# Patient Record
Sex: FEMALE | Race: OTHER | NOT HISPANIC OR LATINO | URBAN - METROPOLITAN AREA
[De-identification: names, ages, dates, MRNs, and addresses within clinical notes are randomized per-mention and may not be internally consistent; named-entity substitution may affect disease eponyms.]

---

## 2020-11-12 ENCOUNTER — INPATIENT (INPATIENT)
Facility: HOSPITAL | Age: 75
LOS: 1 days | Discharge: HOME HEALTH | DRG: 871 | End: 2020-11-14
Attending: HOSPITALIST | Admitting: HOSPITALIST
Payer: MEDICARE

## 2020-11-12 VITALS
HEART RATE: 96 BPM | WEIGHT: 210.1 LBS | OXYGEN SATURATION: 93 % | SYSTOLIC BLOOD PRESSURE: 101 MMHG | DIASTOLIC BLOOD PRESSURE: 71 MMHG | TEMPERATURE: 99 F | HEIGHT: 70 IN | RESPIRATION RATE: 16 BRPM

## 2020-11-12 DIAGNOSIS — J18.9 PNEUMONIA, UNSPECIFIED ORGANISM: ICD-10-CM

## 2020-11-12 DIAGNOSIS — A41.9 SEPSIS, UNSPECIFIED ORGANISM: ICD-10-CM

## 2020-11-12 DIAGNOSIS — Z29.9 ENCOUNTER FOR PROPHYLACTIC MEASURES, UNSPECIFIED: ICD-10-CM

## 2020-11-12 DIAGNOSIS — E04.1 NONTOXIC SINGLE THYROID NODULE: ICD-10-CM

## 2020-11-12 DIAGNOSIS — N39.0 URINARY TRACT INFECTION, SITE NOT SPECIFIED: ICD-10-CM

## 2020-11-12 DIAGNOSIS — R41.82 ALTERED MENTAL STATUS, UNSPECIFIED: ICD-10-CM

## 2020-11-12 DIAGNOSIS — J44.9 CHRONIC OBSTRUCTIVE PULMONARY DISEASE, UNSPECIFIED: ICD-10-CM

## 2020-11-12 LAB
ALBUMIN SERPL ELPH-MCNC: 3.4 G/DL — SIGNIFICANT CHANGE UP (ref 3.4–5)
ALP SERPL-CCNC: 79 U/L — SIGNIFICANT CHANGE UP (ref 40–120)
ALT FLD-CCNC: 23 U/L — SIGNIFICANT CHANGE UP (ref 12–42)
AMPHET UR-MCNC: NEGATIVE — SIGNIFICANT CHANGE UP
ANION GAP SERPL CALC-SCNC: 9 MMOL/L — SIGNIFICANT CHANGE UP (ref 9–16)
APPEARANCE UR: CLEAR — SIGNIFICANT CHANGE UP
APTT BLD: 32.6 SEC — SIGNIFICANT CHANGE UP (ref 27.5–35.5)
AST SERPL-CCNC: 28 U/L — SIGNIFICANT CHANGE UP (ref 15–37)
BACTERIA # UR AUTO: ABNORMAL /HPF
BARBITURATES UR SCN-MCNC: NEGATIVE — SIGNIFICANT CHANGE UP
BASOPHILS # BLD AUTO: 0.04 K/UL — SIGNIFICANT CHANGE UP (ref 0–0.2)
BASOPHILS NFR BLD AUTO: 0.3 % — SIGNIFICANT CHANGE UP (ref 0–2)
BENZODIAZ UR-MCNC: NEGATIVE — SIGNIFICANT CHANGE UP
BILIRUB SERPL-MCNC: 0.8 MG/DL — SIGNIFICANT CHANGE UP (ref 0.2–1.2)
BILIRUB UR-MCNC: NEGATIVE — SIGNIFICANT CHANGE UP
BUN SERPL-MCNC: 22 MG/DL — SIGNIFICANT CHANGE UP (ref 7–23)
CALCIUM SERPL-MCNC: 9.6 MG/DL — SIGNIFICANT CHANGE UP (ref 8.5–10.5)
CHLORIDE SERPL-SCNC: 104 MMOL/L — SIGNIFICANT CHANGE UP (ref 96–108)
CK SERPL-CCNC: 89 U/L — SIGNIFICANT CHANGE UP (ref 26–192)
CO2 SERPL-SCNC: 28 MMOL/L — SIGNIFICANT CHANGE UP (ref 22–31)
COCAINE METAB.OTHER UR-MCNC: NEGATIVE — SIGNIFICANT CHANGE UP
COLOR SPEC: YELLOW — SIGNIFICANT CHANGE UP
CREAT SERPL-MCNC: 1.05 MG/DL — SIGNIFICANT CHANGE UP (ref 0.5–1.3)
DIFF PNL FLD: ABNORMAL
EOSINOPHIL # BLD AUTO: 0.03 K/UL — SIGNIFICANT CHANGE UP (ref 0–0.5)
EOSINOPHIL NFR BLD AUTO: 0.2 % — SIGNIFICANT CHANGE UP (ref 0–6)
EPI CELLS # UR: SIGNIFICANT CHANGE UP /HPF (ref 0–5)
ETHANOL SERPL-MCNC: <3 MG/DL — SIGNIFICANT CHANGE UP
GLUCOSE SERPL-MCNC: 135 MG/DL — HIGH (ref 70–99)
GLUCOSE UR QL: NEGATIVE — SIGNIFICANT CHANGE UP
HCT VFR BLD CALC: 42.6 % — SIGNIFICANT CHANGE UP (ref 34.5–45)
HGB BLD-MCNC: 13.8 G/DL — SIGNIFICANT CHANGE UP (ref 11.5–15.5)
IMM GRANULOCYTES NFR BLD AUTO: 0.4 % — SIGNIFICANT CHANGE UP (ref 0–1.5)
INR BLD: 1.11 — SIGNIFICANT CHANGE UP (ref 0.88–1.16)
KETONES UR-MCNC: NEGATIVE — SIGNIFICANT CHANGE UP
LACTATE SERPL-SCNC: 1.2 MMOL/L — SIGNIFICANT CHANGE UP (ref 0.4–2)
LEUKOCYTE ESTERASE UR-ACNC: ABNORMAL
LIDOCAIN IGE QN: 84 U/L — SIGNIFICANT CHANGE UP (ref 73–393)
LYMPHOCYTES # BLD AUTO: 1.45 K/UL — SIGNIFICANT CHANGE UP (ref 1–3.3)
LYMPHOCYTES # BLD AUTO: 11.6 % — LOW (ref 13–44)
MAGNESIUM SERPL-MCNC: 2.1 MG/DL — SIGNIFICANT CHANGE UP (ref 1.6–2.6)
MCHC RBC-ENTMCNC: 30.8 PG — SIGNIFICANT CHANGE UP (ref 27–34)
MCHC RBC-ENTMCNC: 32.4 GM/DL — SIGNIFICANT CHANGE UP (ref 32–36)
MCV RBC AUTO: 95.1 FL — SIGNIFICANT CHANGE UP (ref 80–100)
METHADONE UR-MCNC: NEGATIVE — SIGNIFICANT CHANGE UP
MONOCYTES # BLD AUTO: 0.7 K/UL — SIGNIFICANT CHANGE UP (ref 0–0.9)
MONOCYTES NFR BLD AUTO: 5.6 % — SIGNIFICANT CHANGE UP (ref 2–14)
NEUTROPHILS # BLD AUTO: 10.24 K/UL — HIGH (ref 1.8–7.4)
NEUTROPHILS NFR BLD AUTO: 81.9 % — HIGH (ref 43–77)
NITRITE UR-MCNC: POSITIVE
NRBC # BLD: 0 /100 WBCS — SIGNIFICANT CHANGE UP (ref 0–0)
NT-PROBNP SERPL-SCNC: 232 PG/ML — SIGNIFICANT CHANGE UP
OPIATES UR-MCNC: NEGATIVE — SIGNIFICANT CHANGE UP
PCP SPEC-MCNC: SIGNIFICANT CHANGE UP
PCP UR-MCNC: NEGATIVE — SIGNIFICANT CHANGE UP
PH UR: 6 — SIGNIFICANT CHANGE UP (ref 5–8)
PLATELET # BLD AUTO: 240 K/UL — SIGNIFICANT CHANGE UP (ref 150–400)
POTASSIUM SERPL-MCNC: 4.7 MMOL/L — SIGNIFICANT CHANGE UP (ref 3.5–5.3)
POTASSIUM SERPL-SCNC: 4.7 MMOL/L — SIGNIFICANT CHANGE UP (ref 3.5–5.3)
PROT SERPL-MCNC: 7.9 G/DL — SIGNIFICANT CHANGE UP (ref 6.4–8.2)
PROT UR-MCNC: 30 MG/DL
PROTHROM AB SERPL-ACNC: 13.2 SEC — SIGNIFICANT CHANGE UP (ref 10.6–13.6)
RAPID RVP RESULT: SIGNIFICANT CHANGE UP
RBC # BLD: 4.48 M/UL — SIGNIFICANT CHANGE UP (ref 3.8–5.2)
RBC # FLD: 13.9 % — SIGNIFICANT CHANGE UP (ref 10.3–14.5)
RBC CASTS # UR COMP ASSIST: ABNORMAL /HPF
SARS-COV-2 RNA SPEC QL NAA+PROBE: SIGNIFICANT CHANGE UP
SARS-COV-2 RNA SPEC QL NAA+PROBE: SIGNIFICANT CHANGE UP
SODIUM SERPL-SCNC: 141 MMOL/L — SIGNIFICANT CHANGE UP (ref 132–145)
SP GR SPEC: 1.02 — SIGNIFICANT CHANGE UP (ref 1–1.03)
THC UR QL: NEGATIVE — SIGNIFICANT CHANGE UP
TROPONIN I SERPL-MCNC: <0.017 NG/ML — LOW (ref 0.02–0.06)
UROBILINOGEN FLD QL: 0.2 E.U./DL — SIGNIFICANT CHANGE UP
WBC # BLD: 12.51 K/UL — HIGH (ref 3.8–10.5)
WBC # FLD AUTO: 12.51 K/UL — HIGH (ref 3.8–10.5)
WBC UR QL: > 10 /HPF

## 2020-11-12 PROCEDURE — 72170 X-RAY EXAM OF PELVIS: CPT | Mod: 26

## 2020-11-12 PROCEDURE — 71045 X-RAY EXAM CHEST 1 VIEW: CPT | Mod: 26

## 2020-11-12 PROCEDURE — 99223 1ST HOSP IP/OBS HIGH 75: CPT | Mod: GC

## 2020-11-12 PROCEDURE — 72125 CT NECK SPINE W/O DYE: CPT | Mod: 26

## 2020-11-12 PROCEDURE — 93010 ELECTROCARDIOGRAM REPORT: CPT

## 2020-11-12 PROCEDURE — 70450 CT HEAD/BRAIN W/O DYE: CPT | Mod: 26

## 2020-11-12 PROCEDURE — 99285 EMERGENCY DEPT VISIT HI MDM: CPT

## 2020-11-12 RX ORDER — QUETIAPINE FUMARATE 200 MG/1
25 TABLET, FILM COATED ORAL ONCE
Refills: 0 | Status: COMPLETED | OUTPATIENT
Start: 2020-11-12 | End: 2020-11-12

## 2020-11-12 RX ORDER — ACETAMINOPHEN 500 MG
1000 TABLET ORAL ONCE
Refills: 0 | Status: COMPLETED | OUTPATIENT
Start: 2020-11-12 | End: 2020-11-12

## 2020-11-12 RX ORDER — ENOXAPARIN SODIUM 100 MG/ML
40 INJECTION SUBCUTANEOUS EVERY 24 HOURS
Refills: 0 | Status: DISCONTINUED | OUTPATIENT
Start: 2020-11-12 | End: 2020-11-14

## 2020-11-12 RX ORDER — SODIUM CHLORIDE 9 MG/ML
1000 INJECTION INTRAMUSCULAR; INTRAVENOUS; SUBCUTANEOUS
Refills: 0 | Status: DISCONTINUED | OUTPATIENT
Start: 2020-11-12 | End: 2020-11-14

## 2020-11-12 RX ORDER — SODIUM CHLORIDE 9 MG/ML
1000 INJECTION INTRAMUSCULAR; INTRAVENOUS; SUBCUTANEOUS
Refills: 0 | Status: DISCONTINUED | OUTPATIENT
Start: 2020-11-12 | End: 2020-11-12

## 2020-11-12 RX ORDER — AZITHROMYCIN 500 MG/1
500 TABLET, FILM COATED ORAL EVERY 24 HOURS
Refills: 0 | Status: COMPLETED | OUTPATIENT
Start: 2020-11-12 | End: 2020-11-14

## 2020-11-12 RX ORDER — CEFTRIAXONE 500 MG/1
1000 INJECTION, POWDER, FOR SOLUTION INTRAMUSCULAR; INTRAVENOUS EVERY 24 HOURS
Refills: 0 | Status: DISCONTINUED | OUTPATIENT
Start: 2020-11-13 | End: 2020-11-14

## 2020-11-12 RX ORDER — CEFTRIAXONE 500 MG/1
1000 INJECTION, POWDER, FOR SOLUTION INTRAMUSCULAR; INTRAVENOUS ONCE
Refills: 0 | Status: COMPLETED | OUTPATIENT
Start: 2020-11-12 | End: 2020-11-12

## 2020-11-12 RX ORDER — SODIUM CHLORIDE 9 MG/ML
1000 INJECTION INTRAMUSCULAR; INTRAVENOUS; SUBCUTANEOUS ONCE
Refills: 0 | Status: COMPLETED | OUTPATIENT
Start: 2020-11-12 | End: 2020-11-12

## 2020-11-12 RX ADMIN — AZITHROMYCIN 255 MILLIGRAM(S): 500 TABLET, FILM COATED ORAL at 22:23

## 2020-11-12 RX ADMIN — Medication 400 MILLIGRAM(S): at 17:54

## 2020-11-12 RX ADMIN — CEFTRIAXONE 100 MILLIGRAM(S): 500 INJECTION, POWDER, FOR SOLUTION INTRAMUSCULAR; INTRAVENOUS at 12:18

## 2020-11-12 RX ADMIN — SODIUM CHLORIDE 130 MILLILITER(S): 9 INJECTION INTRAMUSCULAR; INTRAVENOUS; SUBCUTANEOUS at 19:32

## 2020-11-12 RX ADMIN — SODIUM CHLORIDE 100 MILLILITER(S): 9 INJECTION INTRAMUSCULAR; INTRAVENOUS; SUBCUTANEOUS at 22:25

## 2020-11-12 RX ADMIN — SODIUM CHLORIDE 1000 MILLILITER(S): 9 INJECTION INTRAMUSCULAR; INTRAVENOUS; SUBCUTANEOUS at 12:19

## 2020-11-12 RX ADMIN — ENOXAPARIN SODIUM 40 MILLIGRAM(S): 100 INJECTION SUBCUTANEOUS at 19:32

## 2020-11-12 RX ADMIN — QUETIAPINE FUMARATE 25 MILLIGRAM(S): 200 TABLET, FILM COATED ORAL at 13:24

## 2020-11-12 NOTE — ED ADULT NURSE REASSESSMENT NOTE - NS ED NURSE REASSESS COMMENT FT1
Pt asking to C-Collar to be removed, explained to patient that she could suffer permanent disability if C-collar is removed prior to imaging. Upon reassessment of patient, patient self-discontinued her C-Collar. Patient refusing to have the c-collar placed back on her neck, explained to patient the risks of leaving c-collar off prior to imaging - despite reinforcement patient adamantly refuses.

## 2020-11-12 NOTE — H&P ADULT - PROBLEM SELECTOR PLAN 1
Patient meets 3/4 SIRS criteria (fever, tachycardia, leukocytosis) with likely source being UTI. S/p 1g Rocephin and 1L NS bolus in ED. Currently patient is somnolent likely due to being given Seroquel, however cannot rule out AMS due to sepsis from UTI.   -Continue ceftriaxone 1g once daily for 7 days   -MIVF (130cc/hr) for 12 hours   -Etiologies include sepsis 2/2 UTI vs pyelonephritis   -F/u blood and urine cultures  -Reassess neurologic status once Seroquel has weaned off. Dysphagia screen before starting diet. Patient meets 3/4 SIRS criteria (fever, tachycardia, leukocytosis) with likely source being UTI. S/p 1g Rocephin and 1L NS bolus in ED. Currently patient is somnolent likely due to being given Seroquel, however cannot rule out AMS due to sepsis from UTI.   -Etiologies include sepsis 2/2 UTI vs pyelonephritis   -Continue ceftriaxone 1g once daily for 7 days   -MIVF (130cc/hr) for 12 hours   -F/u blood and urine cultures Patient meets 3/4 SIRS criteria (fever, tachycardia, leukocytosis) with likely source being UTI vs pneumonia. S/p 1g Rocephin and 1L NS bolus in ED. Currently patient is somnolent likely due to being given Seroquel, however cannot rule out AMS due to sepsis from UTI.   -Continue ceftriaxone 1g once daily for 7 days - for UTI and CAP  -Azithromycin 500mg q24H for 3 days for coverage of CAP   -MIVF (100cc/hr) for 12 hours   -F/u blood and urine cultures

## 2020-11-12 NOTE — ED PROVIDER NOTE - MUSCULOSKELETAL, MLM
Spine appears normal, no c-spine tenderness, no obvious signs of trauma, range of motion is not limited, no muscle or joint tenderness

## 2020-11-12 NOTE — H&P ADULT - NSHPSOCIALHISTORY_GEN_ALL_CORE
Unable to obtain due to clinical status Unable to obtain due to clinical status    ADDENDUM: former smoker

## 2020-11-12 NOTE — H&P ADULT - ATTENDING COMMENTS
patient seen and examined overnight ; more alert and less somnolent at time of my exam, able to give more past medical history  ; pt requesting her COPD meds    reviewed pertinent data , h&p    obese, female, NAD, mmm, s1s2, fine bibasilar crackles ; abdomen soft/nt/nd; +LLEXT calf edema and tenderness     1. sepsis/ UTI/ PNA: ON cef/ azithromycin, followup ctxs, on IVFs o/n.   2. followup lower ext dopplers r/o  dvt   3. obtain med rec       rest of  plan as above, with noted addenda

## 2020-11-12 NOTE — H&P ADULT - PROBLEM SELECTOR PLAN 4
1.3 cm hypodense nodule within the left thyroid lobe - can be evaluated with a dedicated outpatient thyroid ultrasound  -F/u thyroid studies Per records, patient has diagnosis of COPD, however unable to assess functional capacity or oxygen requirements.   -Currently patient is on 2L NC. In ED, patient was 96% on RA. Wean as tolerated.   -Will need further information from patient once more awake Patient noted to be somnolent but arousable to verbal and tactile stimulation, able to squeeze fingers on command. However in ED prior to being transferred to [facility], patient noted to be much more awake and conversational, at one point verbalizing that she does not want cervical collar - therefore AMS likely due to Seroquel given in ED, however cannot rule out encephalopathy 2/2 sepsis from UTI.   -UTox negative   -Reassess neurologic status once Seroquel has weaned off. Dysphagia screen before starting diet.

## 2020-11-12 NOTE — H&P ADULT - ASSESSMENT
75 yo F w/ PMHx of COPD and L eye blindness who was BIBEMS after being found down, admitted for sepsis 2/2 UTI.

## 2020-11-12 NOTE — ED PROVIDER NOTE - ATTENDING CONTRIBUTION TO CARE
Pt is a 75yo F with a h/o COPD who presents s/p fall.  Was down on floor for ~ 6 hours and couldn't get up.    PE - agree with PA exam as above.   A/P - Fall, generalized weakness, AMS.   Concern for pt's well being as she appears altered and unstable for d/c.

## 2020-11-12 NOTE — ED PROVIDER NOTE - OBJECTIVE STATEMENT
75 yo F w/ PMHx of COPD, uses walker, has HHA, is a poor historian BIBEMS after pt was found down on the floor for 6 hours. Pt used Life Alert necklace; pt cannot recall events preceding the fall, she doesn't know she got onto the ground. Pt c/o generalized myalgias. Pt denies fever, chills, N/V.

## 2020-11-12 NOTE — ED ADULT NURSE NOTE - NSIMPLEMENTINTERV_GEN_ALL_ED
Implemented All Universal Safety Interventions:  [place] to call system. Call bell, personal items and telephone within reach. Instruct patient to call for assistance. Room bathroom lighting operational. Non-slip footwear when patient is off stretcher. Physically safe environment: no spills, clutter or unnecessary equipment. Stretcher in lowest position, wheels locked, appropriate side rails in place.

## 2020-11-12 NOTE — H&P ADULT - HISTORY OF PRESENT ILLNESS
73 yo F w/ PMHx of COPD, uses walker, has HHA, is a poor historian BIBEMS after pt was found down on the floor for 6 hours. Pt used Life Alert necklace; pt cannot recall events preceding the fall, she doesn't know she got onto the ground. Pt c/o generalized myalgias. Pt denies fever, chills, N/V. 75 yo F w/ PMHx of COPD and L eye blindness who was BIBEMS after pt was found down on the floor for 6 hours. Patient could not answer questions during exam due to being somnolent after being given Seroquel in the ED, but information from previous note shows that patient used Life Alert necklace - cannot recall events preceding the fall and does not know how she got onto the ground. Patient admits to generalized myalgias but denies any fever or chills. Further information was unable to be obtained from patient, and she has no contact information other than a passport from [facility] in her belongings.     Of note, papers found in patient's belongings show that she was recently seen in [facility] ED on 11/2/20 for neck pain s/p fall from standing. Obtained collateral from [facility]: vitals stable in ED, imaging (CXR, CT C spine) negative for fracture or intracranial abnormality and UA clean at that time. Discharged from ED with no complications. Home meds not listed in records at [facility].     ED Course:   Vitals: /77, P 106, O2 96% 2LNC, R 18, T 101.8 (rectal)  Labs: WBC 12.5 w/ neutro predominance, trops negative, lactate 1.2, UA c/w UTI.   EKG: NSR  Meds: 1g Ceftriaxone, 25mg Seroquel,   Imaging: 73 yo F w/ PMHx of COPD and L eye blindness who was BIBEMS after pt was found down on the floor for 6 hours. Upon arrival to ED, patient was conversational and able to answer questions. Per ED note, patient used Life Alert necklace - cannot recall events preceding the fall and does not know how she got onto the ground. Patient admits to generalized myalgias but denies any fever or chills. In ED, patient noted to be clinically deteriorating and becoming altered - when assessed on the floor, was very somnolent and could not answer questions likely due to being given Seroquel in the ED. Further information was unable to be obtained from patient, and she has no contact information other than a passport from [facility] in her belongings.     Of note, papers found in patient's belongings show that she was recently seen in [place] ED on 11/2/20 for neck pain s/p fall from standing. Obtained collateral from [facility]: vitals stable in ED, imaging (CXR, CT C spine) negative for fracture or intracranial abnormality and UA clean at that time. Discharged from ED with no complications. Home meds not listed in records at [facility].     ED Course:   Vitals: /77, P 106, O2 96% 2LNC, R 18, T 101.8 (rectal)  Labs: WBC 12.5 w/ neutro predominance, trops negative, lactate 1.2, UA c/w UTI.   EKG: NSR  Meds: 1g Ceftriaxone, 25mg Seroquel, 1L NS bolus given, IV tylenol.   Imaging: CTH, CXR unremarkable.   Micro: Blood cultures x 2 sent. Urine culture sent 75 yo F w/ PMHx of COPD and L eye blindness who was BIBEMS after pt was found down on the floor for 6 hours. Upon arrival to ED, patient was conversational and able to answer questions. Per ED note, patient used Life Alert necklace - cannot recall events preceding the fall and does not know how she got onto the ground. Patient admits to generalized myalgias but denies any fever or chills. In ED, patient noted to be clinically deteriorating and becoming altered - when assessed on the floor, was very somnolent and could not answer questions likely due to being given Seroquel in the ED. Further information was unable to be obtained from patient, and she has no contact information other than a passport from [facility] in her belongings.     Of note, papers found in patient's belongings show that she was recently seen in [place] ED on 11/2/20 for neck pain s/p fall from standing. Obtained collateral from [place]: vitals stable in ED, imaging (CXR, CT C spine) negative for fracture or intracranial abnormality and UA clean at that time. Discharged from ED with no complications. Home meds not listed in records at [place].     ED Course:   Vitals: /77, P 106, O2 96% 2LNC, R 18, T 101.8 (rectal)  Labs: WBC 12.5 w/ neutro predominance, trops negative, lactate 1.2, UA c/w UTI. UTox and alcohol negative.   Micro: Blood cultures x 2 sent. Urine culture sent   EKG: NSR  Meds: 1g Ceftriaxone, 25mg Seroquel, 1L NS bolus given, IV tylenol.   Imaging: CTH, CXR unremarkable. C Spine showed small thyroid nodule. 73 yo F w/ PMHx of COPD and L eye blindness who was BIBEMS after pt was found down on the floor for 6 hours - transferred from [facility]. Upon arrival to ED, patient was conversational and able to answer questions. Per ED note, patient used Life Alert necklace - cannot recall events preceding the fall and does not know how she got onto the ground. Patient admits to generalized myalgias but denies any fever or chills. In ED, patient noted to be clinically deteriorating and becoming altered - when assessed on the floor, was very somnolent and could not answer questions likely due to being given Seroquel in the ED. Further information was unable to be obtained from patient, and she has no contact information other than a passport from [facility] in her belongings.     Of note, papers found in patient's belongings show that she was recently seen in [place] ED on 11/2/20 for neck pain s/p fall from standing. Obtained collateral from [place]: vitals stable in ED, imaging (CXR, CT C spine) negative for fracture or intracranial abnormality and UA clean at that time. Discharged from ED with no complications. Home meds not listed in records at [place].     ED Course:   Vitals: /77, P 106, O2 96% 2LNC, R 18, T 101.8 (rectal)  Labs: WBC 12.5 w/ neutro predominance, trops negative, lactate 1.2, UA c/w UTI. UTox and alcohol negative.   Micro: Blood cultures x 2 sent. Urine culture sent   EKG: NSR  Meds: 1g Ceftriaxone, 25mg Seroquel, 1L NS bolus given, IV tylenol.   Imaging: CTH, CXR with fluffy infiltrates b/L - cannot rule out multifocal PNA vs COVID. C Spine showed small thyroid nodule.

## 2020-11-12 NOTE — H&P ADULT - NSHPLABSRESULTS_GEN_ALL_CORE
LABS:                        13.8   12.51 )-----------( 240      ( 2020 09:18 )             42.6     12    141  |  104  |  22  ----------------------------<  135<H>  4.7   |  28  |  1.05    Ca    9.6      2020 09:18  Mg     2.1         TPro  7.9  /  Alb  3.4  /  TBili  0.8  /  DBili  x   /  AST  28  /  ALT  23  /  AlkPhos  79  12    PT/INR - ( 2020 09:18 )   PT: 13.2 sec;   INR: 1.11          PTT - ( 2020 09:18 )  PTT:32.6 sec  Urinalysis Basic - ( 2020 11:45 )    Color: Yellow / Appearance: Clear / S.020 / pH: x  Gluc: x / Ketone: NEGATIVE  / Bili: NEGATIVE / Urobili: 0.2 E.U./dL   Blood: x / Protein: 30 mg/dL / Nitrite: POSITIVE   Leuk Esterase: Moderate / RBC: 5-10 /HPF / WBC > 10 /HPF   Sq Epi: x / Non Sq Epi: 0-5 /HPF / Bacteria: Many /HPF      CAPILLARY BLOOD GLUCOSE      POCT Blood Glucose.: 129 mg/dL (2020 09:06)        RADIOLOGY & ADDITIONAL TESTS: Reviewed.    CTH: IMPRESSION: No intracranial hemorrhage or calvarial fracture.  CT C Spine: No acute fracture or malalignment. Multilevel degenerative changes of the cervical spine as above.    1.3 cm hypodense nodule within the left thyroid lobe, this can be evaluated with a dedicated outpatient thyroid ultrasound.

## 2020-11-12 NOTE — ED ADULT NURSE NOTE - CHPI ED NUR SYMPTOMS NEG
no deformity/no weakness/no tingling/no abrasion/no confusion/no vomiting/no bleeding/no loss of consciousness/no numbness/no fever

## 2020-11-12 NOTE — H&P ADULT - PROBLEM SELECTOR PLAN 7
F: s/p 1L NS bolus. MIVF running.  E: replete PRN   N: start once passes dysphagia screen  GI: none needed at this time   DVT: Lovenox    Dispo: RM[facility]  Code Status: Full ADDENDUM: reports left calf tenderness on palpation, there lower ext asymmetry (left larger than right); ordered for dopplers r/o DVT

## 2020-11-12 NOTE — H&P ADULT - PROBLEM SELECTOR PROBLEM 3
COPD (chronic obstructive pulmonary disease) Altered mental status Pneumonia of both lower lobes due to infectious organism

## 2020-11-12 NOTE — H&P ADULT - PROBLEM SELECTOR PLAN 5
F: s/p 1L NS bolus   E: replete PRN   N: start once passes dysphagia screen 1.3 cm hypodense nodule within the left thyroid lobe - can be evaluated with a dedicated outpatient thyroid ultrasound  -F/u thyroid studies Per records, patient has diagnosis of COPD, however unable to assess functional capacity or oxygen requirements.   -Currently patient is on 2L NC. In ED, patient was 96% on RA. Wean as tolerated.   -Will need further information from patient once more awake

## 2020-11-12 NOTE — ED PROVIDER NOTE - GASTROINTESTINAL NEGATIVE STATEMENT, MLM
no nausea and no vomiting. no abdominal pain, no bloating, no constipation, no diarrhea, no nausea and no vomiting.

## 2020-11-12 NOTE — ED ADULT TRIAGE NOTE - CHIEF COMPLAINT QUOTE
BIBA after fall this morning, pressed her life alert necklace. pt is poor historian, unable to fully recall events. pt had discharge paperwork from [place] from 11/02/2020 visit s/p fall with neck pain. +pt arrives with c-collar.

## 2020-11-12 NOTE — H&P ADULT - PROBLEM SELECTOR PLAN 3
Per records, patient has diagnosis of COPD, however unable to assess functional capacity or oxygen requirements.   -Currently patient is on 2L NC  -Will need further information from patient once more awake Patient noted to be somnolent but arousable to verbal and tactile stimulation, able to squeeze fingers on command. However in ED prior to being transferred to [facility], patient noted to be much more awake and conversational, at one point verbalizing that she does not want cervical collar - therefore AMS likely due to Seroquel given in ED, however cannot rule out encephalopathy 2/2 sepsis from UTI.   -Reassess neurologic status once Seroquel has weaned off. Dysphagia screen before starting diet. Patient noted to be somnolent but arousable to verbal and tactile stimulation, able to squeeze fingers on command. However in ED prior to being transferred to [facility], patient noted to be much more awake and conversational, at one point verbalizing that she does not want cervical collar - therefore AMS likely due to Seroquel given in ED, however cannot rule out encephalopathy 2/2 sepsis from UTI.   -UTox negative   -Reassess neurologic status once Seroquel has weaned off. Dysphagia screen before starting diet. ADDENDUM: noted CXR findings, atypical PNA vs COVID, first swab negative will repeat COVID swab, check RVP and also urine legionella; will cover for CAP w/ cef / azithro ADDENDUM: noted CXR findings, atypical PNA vs COVID, first swab negative will repeat COVID swab, check RVP and also urine legionella; will cover for CAP w/ cef / azithro  -Lung exam unremarkable   -F/u labs as mentioned above, in addition to: CRP, procal, ferritin, d-dimer  [facility], however repeated to ensure no cardiac causes. Repeat trops ordered for 10 PM and 11/13 AM

## 2020-11-12 NOTE — H&P ADULT - PROBLEM SELECTOR PLAN 6
F: s/p 1L NS bolus. MIVF running.  E: replete PRN   N: start once passes dysphagia screen  GI: none needed at this time   DVT: Lovenox    Dispo: [facility]  Code Status: Full 1.3 cm hypodense nodule within the left thyroid lobe - can be evaluated with a dedicated outpatient thyroid ultrasound  -F/u thyroid studies

## 2020-11-12 NOTE — ED ADULT NURSE NOTE - CHIEF COMPLAINT QUOTE
BIBA after fall this morning, pressed her life alert necklace. pt is poor historian, unable to fully recall events. pt had discharge paperwork from [place] from 11/02/2020 visit s/p fall with neck pain. +pt arrives with c-collar.

## 2020-11-12 NOTE — H&P ADULT - NSHPPHYSICALEXAM_GEN_ALL_CORE
General: Patient somnolent but arousable to verbal and tactile stimulation.  HEENT: pupils equal round and reactive to light bilaterally, opens eyes to verbal and tactile stimulation. Oral mucosa appears well hydrated, no lesions noted. No thyromegaly, no JVD.   Cardiac: tachycardic, regular rhythm. No m/r/g.   Pulm: CTA bilaterally   Abdominal: soft, NT/ND. No suprapubic tenderness. Unable to assess CVAT. No organomegaly. no rebound tenderness or guarding. Bowel sounds normoactive.   Extremities: bilateral feet cold to touch however DP/PT and radial pulses palpable 2+. No edema, clubbing or cyanosis.   MSK: unable to assess.   Neuro:  strength intact b/L. Could not assess muscle strength or sensation. Withdraws from pain.   Psych: somnolent, unable to assess mood and affect. General: Patient somnolent but arousable to verbal and tactile stimulation.  HEENT: pupils equal round and reactive to light bilaterally, opens eyes to verbal and tactile stimulation. Oral mucosa appears well hydrated, no lesions noted. No thyromegaly, no JVD.   Cardiac: tachycardic, regular rhythm. No m/r/g.   Pulm: CTA bilaterally   Abdominal: soft, NT/ND. No suprapubic tenderness. Unable to assess CVAT. No organomegaly. no rebound tenderness or guarding. Bowel sounds normoactive.   Extremities: bilateral feet cold to touch however DP/PT and radial pulses palpable 2+. No edema, clubbing or cyanosis.   Skin: no ulcers or rashes noted. Skin warm to touch except for cold feet bilaterally.   MSK: unable to assess.   Neuro:  strength intact b/L. Could not assess muscle strength or sensation. Withdraws from pain.   Psych: somnolent, unable to assess mood and affect. General: Patient somnolent but arousable to verbal and tactile stimulation.  HEENT: pupils equal round and reactive to light bilaterally, opens eyes to verbal and tactile stimulation. Oral mucosa appears well hydrated, no lesions noted. No thyromegaly, no JVD.   Cardiac: tachycardic, regular rhythm. No m/r/g.   Pulm: CTA bilaterally   Abdominal: soft, NT/ND. No suprapubic tenderness. Unable to assess CVAT. No organomegaly. no rebound tenderness or guarding. Bowel sounds normoactive.   Extremities: bilateral feet cold to touch however DP/PT and radial pulses palpable 2+. No edema, clubbing or cyanosis.   Skin: no ulcers or rashes noted. Skin warm to touch except for cold feet bilaterally.   MSK: unable to assess.   Neuro:  strength intact b/L. Could not assess muscle strength or sensation. Withdraws from pain. Intermittently follows commands.  Psych: somnolent, unable to assess mood and affect.

## 2020-11-13 ENCOUNTER — TRANSCRIPTION ENCOUNTER (OUTPATIENT)
Age: 75
End: 2020-11-13

## 2020-11-13 DIAGNOSIS — M79.669 PAIN IN UNSPECIFIED LOWER LEG: ICD-10-CM

## 2020-11-13 DIAGNOSIS — Z96.649 PRESENCE OF UNSPECIFIED ARTIFICIAL HIP JOINT: Chronic | ICD-10-CM

## 2020-11-13 LAB
ANION GAP SERPL CALC-SCNC: 10 MMOL/L — SIGNIFICANT CHANGE UP (ref 5–17)
BASOPHILS # BLD AUTO: 0.03 K/UL — SIGNIFICANT CHANGE UP (ref 0–0.2)
BASOPHILS NFR BLD AUTO: 0.3 % — SIGNIFICANT CHANGE UP (ref 0–2)
BUN SERPL-MCNC: 19 MG/DL — SIGNIFICANT CHANGE UP (ref 7–23)
CALCIUM SERPL-MCNC: 8.4 MG/DL — SIGNIFICANT CHANGE UP (ref 8.4–10.5)
CHLORIDE SERPL-SCNC: 103 MMOL/L — SIGNIFICANT CHANGE UP (ref 96–108)
CO2 SERPL-SCNC: 26 MMOL/L — SIGNIFICANT CHANGE UP (ref 22–31)
CREAT SERPL-MCNC: 0.95 MG/DL — SIGNIFICANT CHANGE UP (ref 0.5–1.3)
EOSINOPHIL # BLD AUTO: 0.07 K/UL — SIGNIFICANT CHANGE UP (ref 0–0.5)
EOSINOPHIL NFR BLD AUTO: 0.7 % — SIGNIFICANT CHANGE UP (ref 0–6)
GLUCOSE SERPL-MCNC: 107 MG/DL — HIGH (ref 70–99)
HCT VFR BLD CALC: 38.9 % — SIGNIFICANT CHANGE UP (ref 34.5–45)
HCV AB S/CO SERPL IA: 0.17 S/CO — SIGNIFICANT CHANGE UP
HCV AB SERPL-IMP: SIGNIFICANT CHANGE UP
HGB BLD-MCNC: 12.3 G/DL — SIGNIFICANT CHANGE UP (ref 11.5–15.5)
IMM GRANULOCYTES NFR BLD AUTO: 0.3 % — SIGNIFICANT CHANGE UP (ref 0–1.5)
LYMPHOCYTES # BLD AUTO: 1.17 K/UL — SIGNIFICANT CHANGE UP (ref 1–3.3)
LYMPHOCYTES # BLD AUTO: 11.8 % — LOW (ref 13–44)
MAGNESIUM SERPL-MCNC: 2 MG/DL — SIGNIFICANT CHANGE UP (ref 1.6–2.6)
MCHC RBC-ENTMCNC: 31.5 PG — SIGNIFICANT CHANGE UP (ref 27–34)
MCHC RBC-ENTMCNC: 31.6 GM/DL — LOW (ref 32–36)
MCV RBC AUTO: 99.7 FL — SIGNIFICANT CHANGE UP (ref 80–100)
MONOCYTES # BLD AUTO: 0.79 K/UL — SIGNIFICANT CHANGE UP (ref 0–0.9)
MONOCYTES NFR BLD AUTO: 8 % — SIGNIFICANT CHANGE UP (ref 2–14)
NEUTROPHILS # BLD AUTO: 7.79 K/UL — HIGH (ref 1.8–7.4)
NEUTROPHILS NFR BLD AUTO: 78.9 % — HIGH (ref 43–77)
NRBC # BLD: 0 /100 WBCS — SIGNIFICANT CHANGE UP (ref 0–0)
NT-PROBNP SERPL-SCNC: 286 PG/ML — SIGNIFICANT CHANGE UP (ref 0–300)
PHOSPHATE SERPL-MCNC: 3.1 MG/DL — SIGNIFICANT CHANGE UP (ref 2.5–4.5)
PLATELET # BLD AUTO: 190 K/UL — SIGNIFICANT CHANGE UP (ref 150–400)
POTASSIUM SERPL-MCNC: 4.2 MMOL/L — SIGNIFICANT CHANGE UP (ref 3.5–5.3)
POTASSIUM SERPL-SCNC: 4.2 MMOL/L — SIGNIFICANT CHANGE UP (ref 3.5–5.3)
PROCALCITONIN SERPL-MCNC: 0.33 NG/ML — HIGH (ref 0.02–0.1)
RBC # BLD: 3.9 M/UL — SIGNIFICANT CHANGE UP (ref 3.8–5.2)
RBC # FLD: 13.9 % — SIGNIFICANT CHANGE UP (ref 10.3–14.5)
SODIUM SERPL-SCNC: 139 MMOL/L — SIGNIFICANT CHANGE UP (ref 135–145)
TROPONIN T SERPL-MCNC: <0.01 NG/ML — SIGNIFICANT CHANGE UP (ref 0–0.01)
TSH SERPL-MCNC: 0.46 UIU/ML — SIGNIFICANT CHANGE UP (ref 0.35–4.94)
WBC # BLD: 9.88 K/UL — SIGNIFICANT CHANGE UP (ref 3.8–10.5)
WBC # FLD AUTO: 9.88 K/UL — SIGNIFICANT CHANGE UP (ref 3.8–10.5)

## 2020-11-13 PROCEDURE — 81001 URINALYSIS AUTO W/SCOPE: CPT

## 2020-11-13 PROCEDURE — 93970 EXTREMITY STUDY: CPT

## 2020-11-13 PROCEDURE — 96372 THER/PROPH/DIAG INJ SC/IM: CPT | Mod: XU

## 2020-11-13 PROCEDURE — 93005 ELECTROCARDIOGRAM TRACING: CPT

## 2020-11-13 PROCEDURE — 87635 SARS-COV-2 COVID-19 AMP PRB: CPT

## 2020-11-13 PROCEDURE — 85610 PROTHROMBIN TIME: CPT

## 2020-11-13 PROCEDURE — 70450 CT HEAD/BRAIN W/O DYE: CPT

## 2020-11-13 PROCEDURE — 87040 BLOOD CULTURE FOR BACTERIA: CPT

## 2020-11-13 PROCEDURE — 96375 TX/PRO/DX INJ NEW DRUG ADDON: CPT

## 2020-11-13 PROCEDURE — 72125 CT NECK SPINE W/O DYE: CPT

## 2020-11-13 PROCEDURE — 84443 ASSAY THYROID STIM HORMONE: CPT

## 2020-11-13 PROCEDURE — 80053 COMPREHEN METABOLIC PANEL: CPT

## 2020-11-13 PROCEDURE — 86803 HEPATITIS C AB TEST: CPT

## 2020-11-13 PROCEDURE — 85025 COMPLETE CBC W/AUTO DIFF WBC: CPT

## 2020-11-13 PROCEDURE — 99233 SBSQ HOSP IP/OBS HIGH 50: CPT | Mod: GC

## 2020-11-13 PROCEDURE — 80048 BASIC METABOLIC PNL TOTAL CA: CPT

## 2020-11-13 PROCEDURE — 83880 ASSAY OF NATRIURETIC PEPTIDE: CPT

## 2020-11-13 PROCEDURE — 82962 GLUCOSE BLOOD TEST: CPT

## 2020-11-13 PROCEDURE — 85730 THROMBOPLASTIN TIME PARTIAL: CPT

## 2020-11-13 PROCEDURE — 84100 ASSAY OF PHOSPHORUS: CPT

## 2020-11-13 PROCEDURE — 72170 X-RAY EXAM OF PELVIS: CPT

## 2020-11-13 PROCEDURE — 83605 ASSAY OF LACTIC ACID: CPT

## 2020-11-13 PROCEDURE — 0225U NFCT DS DNA&RNA 21 SARSCOV2: CPT

## 2020-11-13 PROCEDURE — 36415 COLL VENOUS BLD VENIPUNCTURE: CPT

## 2020-11-13 PROCEDURE — 80307 DRUG TEST PRSMV CHEM ANLYZR: CPT

## 2020-11-13 PROCEDURE — 71045 X-RAY EXAM CHEST 1 VIEW: CPT

## 2020-11-13 PROCEDURE — 84484 ASSAY OF TROPONIN QUANT: CPT

## 2020-11-13 PROCEDURE — 71045 X-RAY EXAM CHEST 1 VIEW: CPT | Mod: 26

## 2020-11-13 PROCEDURE — 84145 PROCALCITONIN (PCT): CPT

## 2020-11-13 PROCEDURE — 99285 EMERGENCY DEPT VISIT HI MDM: CPT | Mod: 25

## 2020-11-13 PROCEDURE — 82550 ASSAY OF CK (CPK): CPT

## 2020-11-13 PROCEDURE — 83735 ASSAY OF MAGNESIUM: CPT

## 2020-11-13 PROCEDURE — 94640 AIRWAY INHALATION TREATMENT: CPT

## 2020-11-13 PROCEDURE — 93970 EXTREMITY STUDY: CPT | Mod: 26

## 2020-11-13 PROCEDURE — 96374 THER/PROPH/DIAG INJ IV PUSH: CPT

## 2020-11-13 PROCEDURE — 83690 ASSAY OF LIPASE: CPT

## 2020-11-13 RX ORDER — CITALOPRAM 10 MG/1
1 TABLET, FILM COATED ORAL
Qty: 0 | Refills: 0 | DISCHARGE

## 2020-11-13 RX ORDER — IPRATROPIUM/ALBUTEROL SULFATE 18-103MCG
3 AEROSOL WITH ADAPTER (GRAM) INHALATION EVERY 6 HOURS
Refills: 0 | Status: DISCONTINUED | OUTPATIENT
Start: 2020-11-13 | End: 2020-11-14

## 2020-11-13 RX ADMIN — CEFTRIAXONE 100 MILLIGRAM(S): 500 INJECTION, POWDER, FOR SOLUTION INTRAMUSCULAR; INTRAVENOUS at 12:07

## 2020-11-13 RX ADMIN — ENOXAPARIN SODIUM 40 MILLIGRAM(S): 100 INJECTION SUBCUTANEOUS at 19:11

## 2020-11-13 RX ADMIN — Medication 3 MILLILITER(S): at 09:17

## 2020-11-13 NOTE — DISCHARGE NOTE NURSING/CASE MANAGEMENT/SOCIAL WORK - PATIENT PORTAL LINK FT
You can access the FollowMyHealth Patient Portal offered by [facility] by registering at the following website: [email or web address]. By joining [employer]’s FollowMyHealth portal, you will also be able to view your health information using other applications (apps) compatible with our system.

## 2020-11-13 NOTE — CONSULT NOTE ADULT - SUBJECTIVE AND OBJECTIVE BOX
Patient is a 74y old  Female who presents with a chief complaint of UTI (2020 17:37)       HPI:  73 yo F w/ PMHx of COPD and L eye blindness who was BIBEMS after pt was found down on the floor for 6 hours - transferred from [facility]. Upon arrival to ED, patient was conversational and able to answer questions. Per ED note, patient used Life Alert necklace - cannot recall events preceding the fall and does not know how she got onto the ground. Patient admits to generalized myalgias but denies any fever or chills. In ED, patient noted to be clinically deteriorating and becoming altered - when assessed on the floor, was very somnolent and could not answer questions likely due to being given Seroquel in the ED. Further information was unable to be obtained from patient, and she has no contact information other than a passport from [facility] in her belongings.     Of note, papers found in patient's belongings show that she was recently seen in [facility] ED on 20 for neck pain s/p fall from standing. Obtained collateral from [facility]: vitals stable in ED, imaging (CXR, CT C spine) negative for fracture or intracranial abnormality and UA clean at that time. Discharged from ED with no complications. Home meds not listed in records at [facility].     ED Course:   Vitals: /77, P 106, O2 96% 2LNC, R 18, T 101.8 (rectal)  Labs: WBC 12.5 w/ neutro predominance, trops negative, lactate 1.2, UA c/w UTI. UTox and alcohol negative.   Micro: Blood cultures x 2 sent. Urine culture sent   EKG: NSR  Meds: 1g Ceftriaxone, 25mg Seroquel, 1L NS bolus given, IV tylenol.   Imaging: CTH, CXR with fluffy infiltrates b/L - cannot rule out multifocal PNA vs COVID. C Spine showed small thyroid nodule.  (2020 17:37)      PAST MEDICAL & SURGICAL HISTORY:  History of COPD    History of hip replacement        MEDICATIONS  (STANDING):  azithromycin  IVPB 500 milliGRAM(s) IV Intermittent every 24 hours  cefTRIAXone   IVPB 1000 milliGRAM(s) IV Intermittent every 24 hours  enoxaparin Injectable 40 milliGRAM(s) SubCutaneous every 24 hours  sodium chloride 0.9%. 1000 milliLiter(s) (100 mL/Hr) IV Continuous <Continuous>    MEDICATIONS  (PRN):  albuterol/ipratropium for Nebulization 3 milliLiter(s) Nebulizer every 6 hours PRN Shortness of Breath and/or Wheezing      FAMILY HISTORY:  FH: cancer  mother and father        CBC Full  -  ( 2020 06:17 )  WBC Count : 9.88 K/uL  RBC Count : 3.90 M/uL  Hemoglobin : 12.3 g/dL  Hematocrit : 38.9 %  Platelet Count - Automated : 190 K/uL  Mean Cell Volume : 99.7 fl  Mean Cell Hemoglobin : 31.5 pg  Mean Cell Hemoglobin Concentration : 31.6 gm/dL  Auto Neutrophil # : 7.79 K/uL  Auto Lymphocyte # : 1.17 K/uL  Auto Monocyte # : 0.79 K/uL  Auto Eosinophil # : 0.07 K/uL  Auto Basophil # : 0.03 K/uL  Auto Neutrophil % : 78.9 %  Auto Lymphocyte % : 11.8 %  Auto Monocyte % : 8.0 %  Auto Eosinophil % : 0.7 %  Auto Basophil % : 0.3 %          139  |  103  |  19  ----------------------------<  107<H>  4.2   |  26  |  0.95    Ca    8.4      2020 06:17  Phos  3.1     11-  Mg     2.0         TPro  7.9  /  Alb  3.4  /  TBili  0.8  /  DBili  x   /  AST  28  /  ALT  23  /  AlkPhos  79  11-12      Urinalysis Basic - ( 2020 11:45 )    Color: Yellow / Appearance: Clear / S.020 / pH: x  Gluc: x / Ketone: NEGATIVE  / Bili: NEGATIVE / Urobili: 0.2 E.U./dL   Blood: x / Protein: 30 mg/dL / Nitrite: POSITIVE   Leuk Esterase: Moderate / RBC: 5-10 /HPF / WBC > 10 /HPF   Sq Epi: x / Non Sq Epi: 0-5 /HPF / Bacteria: Many /HPF          Radiology:    < from: Xray Chest 1 View- PORTABLE-Routine (Xray Chest 1 View- PORTABLE-Routine in AM.) (. @ 06:25) >  EXAM:  XR CHEST PORTABLE ROUTINE 1V                          PROCEDURE DATE:  2020          INTERPRETATION:  Clinical history/reason for exam: Pneumonia.    Frontal chest.    COMPARISON: 2020.    Findings/  impression: Right basilar opacity, stable. Left basilar opacity, increased. Right hilar opacity and right upper lobe focal atelectasis. Heart size within normal limits, thoracic aortic calcification. Stable bony structures.        < from: CT Head No Cont (20 @ 10:29) >  EXAM:  CT BRAIN                           PROCEDURE DATE:  2020          INTERPRETATION:  PROCEDURE: CT head without intravenous contrast    INDICATION: Trauma; status post fall    TECHNIQUE: Multiple axial images were obtained at 5 mm intervals from the skull base to the vertex. Sagittal and coronal reformatted images were obtained from the axial data set. The images were reviewed in brain and bone windows. Some of the images are degraded    COMPARISON: None    FINDINGS: The CT examination demonstrates generalized volume loss as manifested by the enlargement of the ventricles, cisternal spaces, and cortical sulci throughout. There is no midline shift or extra axial collections. The gray white differentiation appears within normal limits. There is no intracranial hemorrhage. There are punctate hypodensities within the left caudate head, left putamen and left posterior limb of the internal capsule compatible with lacunar infarcts of indeterminate age. There are patchy areas of hypodensity within the periventricular white matter which may represent the sequela of small vessel ischemic disease.    The bony windows demonstrates no fractures. The lenses are absent which may be secondary to prior The visualized paranasal sinuses arewithin normal limits. The mastoid air cells are well aerated.    IMPRESSION: No intracranial hemorrhage or calvarial fracture.      < from: CT Cervical Spine No Cont (20 @ 10:30) >  EXAM:  CT CERVICAL SPINE                           PROCEDURE DATE:  2020          INTERPRETATION:  PROCEDURE: CT cervical spine without contrast    INDICATION: Fall    TECHNIQUE: Multiple axial sections were obtained from the mid orbits to the sternoclavicular joint. Sagittal and coronal reformats were obtained from the axial data set. The images were reviewed in soft tissue and bone windows.    COMPARISON: None    FINDINGS: The CT exam demonstrates loss of the normal cervical lordosis which may be secondary to positioning. The vertebral body heights are maintained. There is diffuse moderate to severe disc space narrowing with anterior osteophytes throughout the cervical spine. There is a 1.3 cm hypodense nodule in the left thyroid lobe. The osseous structures are intact without fracture.    At the C2/3 level, there is uncovertebral and facet hypertrophy resulting in moderate to severe right and moderate left neural foraminal narrowing..    At the C3/4 level, there is a central disc protrusion with ligamentum flavum hypertrophy resulting in mild spinal canal stenosis. There is uncovertebral facet hypertrophy resulting in severe left and mild to moderate right neural foraminal narrowing.    At the C4/5 level, there is a disc osteophyte complex resulting in mild spinal canal stenosis. There is uncovertebral and facet hypertrophy resulting in moderate to severe bilateral neural foraminal narrowing..    At the C5/6 level, there is a disc osteophyte complex resulting in mild spinal canal stenosis. There is uncovertebral and facet hypertrophy resulting in severe right and moderate left neural foraminal narrowing..    At the C6/7 level, there is posterior osseous ridging resulting in mild spinal canal stenosis. There is uncovertebral facet hypertrophy resulting in moderate to severe bilateral neural foraminal narrowing..    At the C7/T1 level, there is no disc herniation. There is no central spinal canal stenosis or neural foramen narrowing.    IMPRESSION:    No acute fracture or malalignment. Multilevel degenerative changes of the cervical spine as above.    1.3 cm hypodense nodule within the left thyroid lobe, this can be evaluated with a dedicated outpatient thyroid ultrasound.                  Vital Signs Last 24 Hrs  T(C): 36.6 (2020 08:42), Max: 38.8 (2020 17:25)  T(F): 97.8 (2020 08:42), Max: 101.8 (2020 17:25)  HR: 72 (2020 08:42) (72 - 114)  BP: 135/78 (2020 08:42) (94/62 - 135/78)  BP(mean): --  RR: 16 (2020 08:42) (16 - 18)  SpO2: 97% (2020 08:42) (93% - 97%)    REVIEW OF SYSTEMS: per HPI      Physical Exam: WDWN 73 yo woman lying in bed, no acute complaints    Head: normocephalic, atraumatic    Eyes: PERRLA, EOMI, no nystagmus, sclera anicteric    ENT: nasal discharge, uvula midline, no oropharyngeal erythema/exudate    Neck: supple, negative JVD, negative carotid bruits, no thyromegaly    Chest: CTA bilaterally, neg wheeze/ rhonchi/ rales/ crackles/ egophany    Cardiovascular: regular rate and rhythm, neg murmurs/rubs/gallops    Abdomen: soft, non distended, non tender to palpation in all 4 quadrants, negative rebound/guarding, normal bowel sounds    Extremities: WWP, neg cyanosis/clubbing/edema, negative calf tenderness to palpation, negative Chivo's sign    Musculoskeletal:      :     Neurologic Exam: A&O x 1 to self, speech fluent w/o dysarthria, follows commands    Motor Exam:    Upper Extremities:     Right:   no focal weakness > 3+/5              pronator drift: neg    Left:     no focal weakness > 3+/5             pronator drift: neg      Lower Extremities:    Right:      no focal weakness > 3+/5    Left :     no focal weakness > 3+/5               Sensation: Intact to light touch x 4 extremities,                                          DTR:            = biceps/     triceps/     brachioradialis                      = patella/   medial hamstring/ankle                       Gait:  not tested        PM&R Impression:    1) s/p unwitnessed fall  2) no focal weakness  3) sepsis/ UTI/ TME    Plan:    1) Physical therapy focusing on therapeutic exercises, bed mobility/transfer out of bed evaluation, progressive ambulation with assistive devices prn.    2) Anticipated Disposition Plan/Recs:   pending functional progress

## 2020-11-13 NOTE — DISCHARGE NOTE PROVIDER - CARE PROVIDER_API CALL
ZACK GALLO  Internal Medicine  [address] [place] [address], [place] [address]  Phone: [phone]  Fax: [phone]  Established Patient  Follow Up Time: 2 weeks

## 2020-11-13 NOTE — DISCHARGE NOTE PROVIDER - NSDCCPCAREPLAN_GEN_ALL_CORE_FT
PRINCIPAL DISCHARGE DIAGNOSIS  Diagnosis: Urinary tract infection without hematuria, site unspecified  Assessment and Plan of Treatment: Urinary tract infections, also called "UTIs," are infections that affect either the bladder or the kidneys. Bladder infections are more common than kidney infections. Bladder infections happen when bacteria get into the urethra and travel up into the bladder. Kidney infections happen when the bacteria travel even higher, up into the kidneys. The symptoms of a bladder infection include pain or a burning feeling when you urinate, the need to urinate often, the need to urinate suddenly or in a hurry, blood in the urine. Signs that an infection has spread to the kidneys include fever, back pain, or nausea/vomiting. It is important that you take your antibiotics as prescribed and to completion to properly treat your urinary tract infection and prevent antibiotic resistance.         PRINCIPAL DISCHARGE DIAGNOSIS  Diagnosis: Acute UTI  Assessment and Plan of Treatment: Urinary tract infections, also called "UTIs," are infections that affect either the bladder or the kidneys. Bladder infections are more common than kidney infections. Bladder infections happen when bacteria get into the urethra and travel up into the bladder. Kidney infections happen when the bacteria travel even higher, up into the kidneys. The symptoms of a bladder infection include pain or a burning feeling when you urinate, the need to urinate often, the need to urinate suddenly or in a hurry, blood in the urine. Signs that an infection has spread to the kidneys include fever, back pain, or nausea/vomiting. It is important that you take your antibiotics as prescribed and to completion to properly treat your urinary tract infection and prevent antibiotic resistance.         PRINCIPAL DISCHARGE DIAGNOSIS  Diagnosis: Acute UTI  Assessment and Plan of Treatment: Urinary tract infections, also called "UTIs," are infections that affect either the bladder or the kidneys. Bladder infections are more common than kidney infections. Bladder infections happen when bacteria get into the urethra and travel up into the bladder. Kidney infections happen when the bacteria travel even higher, up into the kidneys. The symptoms of a bladder infection include pain or a burning feeling when you urinate, the need to urinate often, the need to urinate suddenly or in a hurry, blood in the urine. Signs that an infection has spread to the kidneys include fever, back pain, or nausea/vomiting. It is important that you take your antibiotics as prescribed and to completion to properly treat your urinary tract infection and prevent antibiotic resistance.  Please take Cefpodoxime 200 mg twice a day for 6 days starting tomorrow 11/14/2020.  Please follow up with your primary care physician within 1-2 weeks.      SECONDARY DISCHARGE DIAGNOSES  Diagnosis: Pneumonia of both lower lobes due to infectious organism  Assessment and Plan of Treatment: Upon your arrival to the hospital, we did imaging of your chest which showed that you may have pneumonia. The antibiotic we prescribed you will treat the possible pneumonia. If you develop worsening signs or symptoms, including shortness of breath, chest pain, fevers/chills, dizziness, please seek medical attention.

## 2020-11-13 NOTE — DISCHARGE NOTE PROVIDER - HOSPITAL COURSE
#Discharge: do not delete    Patient is __ yo M/F with past medical history of _____  Presented with _____, found to have _____  73 yo F w/ PMHx of COPD and L eye blindness who was BIBEMS after pt was found down on the floor for 6 hours - transferred from [facility]. Upon arrival to ED, patient was conversational and able to answer questions. Per ED note, patient used Life Alert necklace - cannot recall events preceding the fall and does not know how she got onto the ground. Patient admits to generalized myalgias but denies any fever or chills. In ED, patient noted to be clinically deteriorating and becoming altered - when assessed on the floor, was very somnolent and could not answer questions likely due to being given Seroquel in the ED. Further information was unable to be obtained from patient, and she has no contact information other than a passport from [facility] in her belongings.     Of note, papers found in patient's belongings show that she was recently seen in [facility] ED on 11/2/20 for neck pain s/p fall from standing. Obtained collateral from [facility]: vitals stable in ED, imaging (CXR, CT C spine) negative for fracture or intracranial abnormality and UA clean at that time. Discharged from ED with no complications. Home meds not listed in records at [facility].     ED Course:   Vitals: /77, P 106, O2 96% 2LNC, R 18, T 101.8 (rectal)  Labs: WBC 12.5 w/ neutro predominance, trops negative, lactate 1.2, UA c/w UTI. UTox and alcohol negative.   Micro: Blood cultures x 2 sent. Urine culture sent   EKG: NSR  Meds: 1g Ceftriaxone, 25mg Seroquel, 1L NS bolus given, IV tylenol.   Imaging: CTH, CXR with fluffy infiltrates b/L - cannot rule out multifocal PNA vs COVID. C Spine showed small thyroid nodule.     Problem List/Main Diagnoses (system-based):    Problem/Plan - 1:  ·  Problem: Sepsis.  Plan: Patient meets 3/4 SIRS criteria (fever, tachycardia, leukocytosis) with likely source being UTI vs pneumonia. S/p 1g Rocephin and 1L NS bolus in ED. Currently patient is somnolent likely due to being given Seroquel, however cannot rule out AMS due to sepsis from UTI.   -Continue ceftriaxone 1g once daily for 7 days - for UTI and CAP  -Azithromycin 500mg q24H for 3 days for coverage of CAP   -MIVF (100cc/hr) for 12 hours   -F/u blood and urine cultures.      Problem/Plan - 2:  ·  Problem: Complicated UTI (urinary tract infection).  Plan: Plan as above.      Problem/Plan - 3:  ·  Problem: Pneumonia of both lower lobes due to infectious organism.  Plan: ADDENDUM: noted CXR findings, atypical PNA vs COVID, first swab negative will repeat COVID swab, check RVP and also urine legionella; will cover for CAP w/ cef / azithro  -Lung exam unremarkable   -F/u labs as mentioned above, in addition to: CRP, procal, ferritin, d-dimer  -[facility], however repeated to ensure no cardiac causes. Repeat trops ordered for 10 PM and 11/13 AM.   Inpatient treatment course:   New medications:   Labs to be followed outpatient:   Exam to be followed outpatient:    #Discharge: do not delete    Patient is a 75 yo F w/ PMHx of osteoporosis, depression, COPD, left eye blindness, and recurrent falls (pt reported 8 falls in last year).  Presented after being found down on the floor for 6 hours, brought in by EMS. Upon arrival to ED, patient was conversational and able to answer questions. Per ED note, patient used Life Alert necklace - cannot recall events preceding the fall and does not know how she got onto the ground. Patient admits to generalized myalgias but denies any fever or chills. In ED, patient noted to be clinically deteriorating and becoming altered - when assessed on the floor, was very somnolent and could not answer questions likely due to being given Seroquel in the ED. Further information was unable to be obtained from patient, and she has no contact information other than a passport from [facility] in her belongings.     Of note, papers found in patient's belongings show that she was recently seen in [place] ED on 11/2/20 for neck pain s/p fall from standing. Obtained collateral from [place]: vitals stable in ED, imaging (CXR, CT C spine) negative for fracture or intracranial abnormality and UA clean at that time. Discharged from ED with no complications. Home meds not listed in records at [place].     ED Course:   Vitals: /77, P 106, O2 96% 2LNC, R 18, T 101.8 (rectal)  Labs: WBC 12.5 w/ neutro predominance, trops negative, lactate 1.2, UA c/w UTI. UTox and alcohol negative.   Micro: Blood cultures x 2 sent. Urine culture sent   EKG: NSR  Meds: 1g Ceftriaxone, 25mg Seroquel, 1L NS bolus given, IV tylenol.   Imaging: CTH, CXR with fluffy infiltrates b/L - cannot rule out multifocal PNA vs COVID. C Spine showed small thyroid nodule.     Patient admitted for sepsis secondary to UTI vs PNA.     Problem List/Main Diagnoses (system-based):    Problem/Plan - 1: Sepsis. Patient meet 3/4 SIRS criteria (fever, tachycardia, leukocytosis) with likely source being UTI vs pneumonia. S/p 1g Rocephin and 1L NS bolus in ED. Currently patient is somnolent likely due to being given Seroquel, however cannot rule out AMS due to sepsis from UTI. Continued on ceftriaxone 1g once daily and Azithromycin 500mg q24H for coverage of CAP. Patient switched to PO Cefpodoxime 200 mg BID for 7 days. Blood cultures, no growth to date. Urine cultures pending.      Problem/Plan - 2: Complicated UTI (urinary tract infection). Plan as above.      Problem/Plan - 3: Pneumonia of both lower lobes due to infectious organism. CXR showed possible multifocal pneumonia with R and L basilar opacities and R upper lobe focal atelectasis. Workup atypical PNA vs COVID, COVID swab negative x2. F/u RVP and urine legionella. Patient treated with Cef/Azithryomycin for CAP. Lung exam unremarkable on physical exam. F/u labs as mentioned above, in addition to: CRP, procal, ferritin, d-dimer. Troponin negative.    Inpatient treatment course:   New medications:   Cefpodoxime 200 mg BID for 7 days  Escitalopram 10 mg   Labs to be followed outpatient: n/a  Exam to be followed outpatient:    #Discharge: do not delete    Patient is a 75 yo F w/ PMHx of osteoporosis, depression, COPD, left eye blindness, and recurrent falls (pt reported 8 falls in last year).  Presented after being found down on the floor for 6 hours, brought in by EMS. Upon arrival to ED, patient was conversational and able to answer questions. Per ED note, patient used Life Alert necklace - cannot recall events preceding the fall and does not know how she got onto the ground. Patient admits to generalized myalgias but denies any fever or chills. In ED, patient noted to be clinically deteriorating and becoming altered - when assessed on the floor, was very somnolent and could not answer questions likely due to being given Seroquel in the ED. Further information was unable to be obtained from patient, and she has no contact information other than a passport from [facility] in her belongings.     Of note, papers found in patient's belongings show that she was recently seen in [place] ED on 11/2/20 for neck pain s/p fall from standing. Obtained collateral from [place]: vitals stable in ED, imaging (CXR, CT C spine) negative for fracture or intracranial abnormality and UA clean at that time. Discharged from ED with no complications. Home meds not listed in records at [place].     ED Course:   Vitals: /77, P 106, O2 96% 2LNC, R 18, T 101.8 (rectal)  Labs: WBC 12.5 w/ neutro predominance, trops negative, lactate 1.2, UA c/w UTI. UTox and alcohol negative.   Micro: Blood cultures x 2 sent. Urine culture sent   EKG: NSR  Meds: 1g Ceftriaxone, 25mg Seroquel, 1L NS bolus given, IV tylenol.   Imaging: CTH, CXR with fluffy infiltrates b/L - cannot rule out multifocal PNA vs COVID. C Spine showed small thyroid nodule.     Patient admitted for sepsis secondary to UTI vs PNA.     Problem List/Main Diagnoses (system-based):    Problem/Plan - 1: Sepsis. Patient meet 3/4 SIRS criteria (fever, tachycardia, leukocytosis) with likely source being UTI vs pneumonia. S/p 1g Rocephin and 1L NS bolus in ED. Currently patient is somnolent likely due to being given Seroquel, however cannot rule out altered mental status due to sepsis from UTI. Continued on ceftriaxone 1g once daily and Azithromycin 500mg q24H for coverage of CAP. Patient switched to PO Cefpodoxime 200 mg BID for 7 days. Blood cultures, no growth to date. Urine cultures pending.      Problem/Plan - 2: Complicated UTI (urinary tract infection). Plan as above.      Problem/Plan - 3: Pneumonia, bilateral, lower lobes. CXR showed possible multifocal pneumonia with R and L basilar opacities and R upper lobe focal atelectasis. Workup atypical PNA vs COVID, however COVID swab negative x2. F/u RVP and urine legionella. Patient treated with Cef/Azithryomycin for CAP. Lung exam unremarkable on physical exam. Procal 0.33. F/u labs as mentioned above, in addition to: CRP, ferritin, d-dimer.     New medications:   Cefpodoxime 200 mg BID for 7 days  Escitalopram 10 mg once daily  Labs to be followed outpatient: n/a  Exam to be followed outpatient:    #Discharge: do not delete    Patient is a 75 yo F w/ PMHx of osteoporosis, depression, COPD, left eye blindness, and recurrent falls (pt reported 8 falls in last year).  Presented after being found down on the floor for 6 hours, brought in by EMS. Upon arrival to ED, patient was conversational and able to answer questions. Per ED note, patient used Life Alert necklace - cannot recall events preceding the fall and does not know how she got onto the ground. Patient admits to generalized myalgias but denies any fever or chills. In ED, patient noted to be clinically deteriorating and becoming altered - when assessed on the floor, was very somnolent and could not answer questions likely due to being given Seroquel in the ED. Further information was unable to be obtained from patient, and she has no contact information other than a passport from [facility] in her belongings.     Of note, papers found in patient's belongings show that she was recently seen in [place] ED on 11/2/20 for neck pain s/p fall from standing. Obtained collateral from [facility]: vitals stable in ED, imaging (CXR, CT C spine) negative for fracture or intracranial abnormality and UA clean at that time. Discharged from ED with no complications. Home meds not listed in records at [facility].     ED Course:   Vitals: /77, P 106, O2 96% 2LNC, R 18, T 101.8 (rectal)  Labs: WBC 12.5 w/ neutro predominance, trops negative, lactate 1.2, UA c/w UTI. UTox and alcohol negative.   Micro: Blood cultures x 2 sent. Urine culture sent   EKG: NSR  Meds: 1g Ceftriaxone, 25mg Seroquel, 1L NS bolus given, IV tylenol.   Imaging: CTH, CXR with fluffy infiltrates b/L - cannot rule out multifocal PNA vs COVID. C Spine showed small thyroid nodule.     Patient admitted for sepsis secondary to UTI vs PNA.     Problem List/Main Diagnoses (system-based):    Problem/Plan - 1: Sepsis. Patient meet 3/4 SIRS criteria (fever, tachycardia, leukocytosis) with likely source being UTI vs pneumonia. S/p 1g Rocephin and 1L NS bolus in ED. Currently patient is somnolent likely due to being given Seroquel, however cannot rule out altered mental status due to sepsis from UTI. Continued on ceftriaxone 1g once daily and Azithromycin 500mg q24H for coverage of CAP. Patient switched to PO Cefpodoxime 200 mg BID for 7 days. Blood cultures, no growth to date. Urine cultures pending.     Problem/Plan - 2: Complicated UTI (urinary tract infection). Plan as above.      Problem/Plan - 3: Pneumonia, bilateral, lower lobes. CXR showed possible multifocal pneumonia with R and L basilar opacities and R upper lobe focal atelectasis. Workup atypical PNA vs COVID, however COVID swab negative x2. F/u RVP and urine legionella. Patient treated with Cef/Azithryomycin for CAP. Lung exam unremarkable on physical exam. Procal 0.33. F/u labs as mentioned above, in addition to: CRP, ferritin, d-dimer.     Hospital Course: 75 yo F w/ PMHx of COPD and L eye blindness who was BIBEMS after being found down, admitted for sepsis 2/2 UTI. CXR showed possible multifocal pneumonia with R and L basilar opacities and R upper lobe focal atelectasis. Workup atypical PNA vs COVID, however COVID swab negative x2. Patient was started with Ceftriaxone and Azithromycin for coverage of both UTI and possible CAP. Patient seen by PT - however refused both SHANNAN and home PT. Patient remained hemodynamically stable while inpatient and will be discharged home with Cefpodoxmine for 6 days.     New medications: Cefpodoxime 200 mg BID for 6 days  Labs to be followed outpatient: n/a  Exam to be followed outpatient: n/a   #Discharge: do not delete    Patient is a 75 yo F w/ PMHx of osteoporosis, depression, COPD, left eye blindness, and recurrent falls (pt reported 8 falls in last year).  Presented after being found down on the floor for 6 hours, brought in by EMS. Upon arrival to ED, patient was conversational and able to answer questions. Per ED note, patient used Life Alert necklace - cannot recall events preceding the fall and does not know how she got onto the ground. Patient admits to generalized myalgias but denies any fever or chills. In ED, patient noted to be clinically deteriorating and becoming altered - when assessed on the floor, was very somnolent and could not answer questions likely due to being given Seroquel in the ED. Further information was unable to be obtained from patient, and she has no contact information other than a passport from [facility] in her belongings.     Of note, papers found in patient's belongings show that she was recently seen in [place] ED on 11/2/20 for neck pain s/p fall from standing. Obtained collateral from [place]: vitals stable in ED, imaging (CXR, CT C spine) negative for fracture or intracranial abnormality and UA clean at that time. Discharged from ED with no complications. Home meds not listed in records at [place].     ED Course:   Vitals: /77, P 106, O2 96% 2LNC, R 18, T 101.8 (rectal)  Labs: WBC 12.5 w/ neutro predominance, trops negative, lactate 1.2, UA c/w UTI. UTox and alcohol negative.   Micro: Blood cultures x 2 sent. Urine culture sent   EKG: NSR  Meds: 1g Ceftriaxone, 25mg Seroquel, 1L NS bolus given, IV tylenol.   Imaging: CTH, CXR with fluffy infiltrates b/L - cannot rule out multifocal PNA vs COVID. C Spine showed small thyroid nodule.     Patient admitted for sepsis secondary to UTI vs PNA.     Problem List/Main Diagnoses (system-based):    Problem/Plan - 1: Sepsis. Patient meet 3/4 SIRS criteria (fever, tachycardia, leukocytosis) with likely source being UTI vs pneumonia. S/p 1g Rocephin and 1L NS bolus in ED. Currently patient is somnolent likely due to being given Seroquel, however cannot rule out altered mental status due to sepsis from UTI. Continued on ceftriaxone 1g once daily and Azithromycin 500mg q24H for coverage of CAP. Patient switched to PO Cefpodoxime 200 mg BID for 7 days. Blood cultures, no growth to date. Urine cultures pending.     Problem/Plan - 2: Complicated UTI (urinary tract infection). Plan as above.      Problem/Plan - 3: Pneumonia, bilateral, lower lobes. CXR showed possible multifocal pneumonia with R and L basilar opacities and R upper lobe focal atelectasis. Workup atypical PNA vs COVID, however COVID swab negative x2. F/u RVP and urine legionella. Patient treated with Cef/Azithryomycin for CAP. Lung exam unremarkable on physical exam. Procal 0.33. F/u labs as mentioned above, in addition to: CRP, ferritin, d-dimer.     Hospital Course: 75 yo F w/ PMHx of COPD and L eye blindness who was BIBEMS after being found down, admitted for sepsis 2/2 UTI. CXR showed possible multifocal pneumonia with R and L basilar opacities and R upper lobe focal atelectasis. Workup atypical PNA vs COVID, however COVID swab negative x2. Patient did not endorse any respiratory symptoms, and procalcitonin wasn't significant, therefore there was less suspicion for a bacterial process. Patient was started with Ceftriaxone and Azithromycin for coverage of both UTI and possible CAP. Patient seen by PT - however refused both SHANNAN and home PT. Patient remained hemodynamically stable while inpatient and will be discharged home with Cefpodoxmine for 6 days.     New medications: Cefpodoxime 200 mg BID for 6 days  Labs to be followed outpatient: n/a  Exam to be followed outpatient: n/a   #Discharge: do not delete    Patient is a 75 yo F w/ PMHx of osteoporosis, depression, COPD, left eye blindness, and recurrent falls (pt reported 8 falls in last year).  Presented after being found down on the floor for 6 hours, brought in by EMS. Upon arrival to ED, patient was conversational and able to answer questions. Per ED note, patient used Life Alert necklace - cannot recall events preceding the fall and does not know how she got onto the ground. Patient admits to generalized myalgias but denies any fever or chills. In ED, patient noted to be clinically deteriorating and becoming altered - when assessed on the floor, was very somnolent and could not answer questions likely due to being given Seroquel in the ED. Further information was unable to be obtained from patient, and she has no contact information other than a passport from [facility] in her belongings.     Of note, papers found in patient's belongings show that she was recently seen in [place] ED on 11/2/20 for neck pain s/p fall from standing. Obtained collateral from [place]: vitals stable in ED, imaging (CXR, CT C spine) negative for fracture or intracranial abnormality and UA clean at that time. Discharged from ED with no complications. Home meds not listed in records at [place].     ED Course:   Vitals: /77, P 106, O2 96% 2LNC, R 18, T 101.8 (rectal)  Labs: WBC 12.5 w/ neutro predominance, trops negative, lactate 1.2, UA c/w UTI. UTox and alcohol negative.   Micro: Blood cultures x 2 sent. Urine culture sent   EKG: NSR  Meds: 1g Ceftriaxone, 25mg Seroquel, 1L NS bolus given, IV tylenol.   Imaging: CTH, CXR with fluffy infiltrates b/L - cannot rule out multifocal PNA vs COVID. C Spine showed small thyroid nodule.     Patient admitted for sepsis secondary to UTI vs PNA.     Problem List/Main Diagnoses (system-based):    Problem/Plan - 1: Sepsis. Patient meet 3/4 SIRS criteria (fever, tachycardia, leukocytosis) with likely source being UTI vs pneumonia. S/p 1g Rocephin and 1L NS bolus in ED. Continued on ceftriaxone 1g once daily and Azithromycin 500mg q24H for coverage of CAP. Patient switched to PO Cefpodoxime 200 mg BID for 7 days. Blood cultures, no growth to date. Urine cultures pending.     Problem/Plan - 2: Complicated UTI (urinary tract infection). Plan as above.      Problem/Plan - 3: Pneumonia, bilateral, lower lobes. CXR showed possible multifocal pneumonia with R and L basilar opacities and R upper lobe focal atelectasis. Workup atypical PNA vs COVID, however COVID swab negative x2. F/u RVP and urine legionella. Patient treated with Cef/Azithryomycin for CAP. Lung exam unremarkable on physical exam. Procal 0.33. F/u labs as mentioned above, in addition to: CRP, ferritin, d-dimer.     Hospital Course: 75 yo F w/ PMHx of COPD and L eye blindness who was BIBEMS after being found down, admitted for sepsis 2/2 UTI. CXR showed possible multifocal pneumonia with R and L basilar opacities and R upper lobe focal atelectasis. Workup atypical PNA vs COVID, however COVID swab negative x2. Patient did not endorse any respiratory symptoms, and procalcitonin wasn't significant, therefore there was less suspicion for a bacterial process. Patient was started with Ceftriaxone and Azithromycin for coverage of both UTI and possible CAP. Patient seen by PT - however refused both SHANNAN and home PT. Patient remained hemodynamically stable while inpatient and will be discharged home with Cefpodoxmine for 6 days.     New medications: Cefpodoxime 200 mg BID for 6 days  Labs to be followed outpatient: n/a  Exam to be followed outpatient: n/a   Patient is a 73 yo F w/ PMHx of osteoporosis, depression, COPD, left eye blindness, and recurrent falls (pt reported 8 falls in last year).  Presented after being found down on the floor for 6 hours, brought in by EMS. Upon arrival to ED, patient was conversational and able to answer questions. Per ED note, patient used Life Alert necklace - cannot recall events preceding the fall and does not know how she got onto the ground. Patient admits to generalized myalgias but denies any fever or chills. In ED, patient noted to be clinically deteriorating and becoming altered - when assessed on the floor, was very somnolent and could not answer questions likely due to being given Seroquel in the ED. Further information was unable to be obtained from patient, and she has no contact information other than a passport from [facility] in her belongings.     Of note, papers found in patient's belongings show that she was recently seen in [place] ED on 11/2/20 for neck pain s/p fall from standing. Obtained collateral from [place]: vitals stable in ED, imaging (CXR, CT C spine) negative for fracture or intracranial abnormality and UA clean at that time. Discharged from ED with no complications. Home meds not listed in records at [place].     ED Course:   Vitals: /77, P 106, O2 96% 2LNC, R 18, T 101.8 (rectal)  Labs: WBC 12.5 w/ neutro predominance, trops negative, lactate 1.2, UA c/w UTI. UTox and alcohol negative.   Micro: Blood cultures x 2 sent. Urine culture sent   EKG: NSR  Meds: 1g Ceftriaxone, 25mg Seroquel, 1L NS bolus given, IV tylenol.   Imaging: CTH, CXR with fluffy infiltrates b/L - cannot rule out multifocal PNA vs COVID. C Spine showed small thyroid nodule.     Patient admitted for sepsis secondary to UTI vs PNA.     Problem List/Main Diagnoses (system-based):    Problem/Plan - 1: Sepsis. Patient meet 3/4 SIRS criteria (fever, tachycardia, leukocytosis) with likely source being UTI vs pneumonia. S/p 1g Rocephin and 1L NS bolus in ED. Continued on ceftriaxone 1g once daily and Azithromycin 500mg q24H for coverage of CAP. Patient switched to PO Cefpodoxime 200 mg BID for 7 days. Blood cultures, no growth to date. Urine cultures pending.     Problem/Plan - 2: Complicated UTI (urinary tract infection). Plan as above.      Problem/Plan - 3: Pneumonia, bilateral, lower lobes. CXR showed possible multifocal pneumonia with R and L basilar opacities and R upper lobe focal atelectasis. Workup atypical PNA vs COVID, however COVID swab negative x2. F/u RVP and urine legionella. Patient treated with Cef/Azithryomycin for CAP. Lung exam unremarkable on physical exam. Procal 0.33. F/u labs as mentioned above, in addition to: CRP, ferritin, d-dimer.     Hospital Course: 73 yo F w/ PMHx of COPD and L eye blindness who was BIBEMS after being found down, admitted for sepsis 2/2 UTI. CXR showed possible multifocal pneumonia with R and L basilar opacities and R upper lobe focal atelectasis. Workup atypical PNA vs COVID, however COVID swab negative x2. Patient did not endorse any respiratory symptoms, and procalcitonin wasn't significant, therefore there was less suspicion for a bacterial process. Patient was started with Ceftriaxone and Azithromycin for coverage of both UTI and possible CAP. Patient seen by PT - however refused both SHANNAN and home PT. Patient remained hemodynamically stable while inpatient and will be discharged home with Cefpodoxmine for 6 days.     New medications: Cefpodoxime 200 mg BID for 6 days  Labs to be followed outpatient: n/a  Exam to be followed outpatient: n/a

## 2020-11-13 NOTE — DISCHARGE NOTE PROVIDER - NSDCMRMEDTOKEN_GEN_ALL_CORE_FT
citalopram 10 mg oral tablet: 1 tab(s) orally once a day   cefpodoxime 200 mg oral tablet: 1 tab(s) orally 2 times a day

## 2020-11-13 NOTE — CONSULT NOTE ADULT - ASSESSMENT
per Internal Medicine    73 yo F w/ PMHx of COPD and L eye blindness who was BIBEMS after being found down, admitted for sepsis 2/2 UTI.     Problem/Plan - 1:  ·  Problem: Sepsis.  Plan: Patient meets 3/4 SIRS criteria (fever, tachycardia, leukocytosis) with likely source being UTI vs pneumonia. S/p 1g Rocephin and 1L NS bolus in ED. Currently patient is somnolent likely due to being given Seroquel, however cannot rule out AMS due to sepsis from UTI.   -Continue ceftriaxone 1g once daily for 7 days - for UTI and CAP  -Azithromycin 500mg q24H for 3 days for coverage of CAP   -MIVF (100cc/hr) for 12 hours   -F/u blood and urine cultures.     Problem/Plan - 2:  ·  Problem: Complicated UTI (urinary tract infection).  Plan: Plan as above.     Problem/Plan - 3:  ·  Problem: Pneumonia of both lower lobes due to infectious organism.  Plan: ADDENDUM: noted CXR findings, atypical PNA vs COVID, first swab negative will repeat COVID swab, check RVP and also urine legionella; will cover for CAP w/ cef / azithro  -Lung exam unremarkable   -F/u labs as mentioned above, in addition to: CRP, procal, ferritin, d-dimer  -troponin negative per GV, however repeated to ensure no cardiac causes. Repeat trops ordered for 10 PM and 11/13 AM.     Problem/Plan - 4:  ·  Problem: Altered mental status.  Plan: Patient noted to be somnolent but arousable to verbal and tactile stimulation, able to squeeze fingers on command. However in ED prior to being transferred to [facility], patient noted to be much more awake and conversational, at one point verbalizing that she does not want cervical collar - therefore AMS likely due to Seroquel given in ED, however cannot rule out encephalopathy 2/2 sepsis from UTI.   -UTox negative   -Reassess neurologic status once Seroquel has weaned off. Dysphagia screen before starting diet.     Problem/Plan - 5:  ·  Problem: COPD (chronic obstructive pulmonary disease).  Plan: Per records, patient has diagnosis of COPD, however unable to assess functional capacity or oxygen requirements.   -Currently patient is on 2L NC. In ED, patient was 96% on RA. Wean as tolerated.   -Will need further information from patient once more awake.     Problem/Plan - 6:  Problem: Thyroid nodule. Plan: 1.3 cm hypodense nodule within the left thyroid lobe - can be evaluated with a dedicated outpatient thyroid ultrasound  -F/u thyroid studies.    Problem/Plan - 7:  ·  Problem: Calf tenderness. Plan: ADDENDUM: reports left calf tenderness on palpation, there lower ext asymmetry (left larger than right); ordered for dopplers r/o DVT.    Problem/Plan - 8:  ·  Problem: DVT prophylaxis.  Plan: F: s/p 1L NS bolus. MIVF running.  E: replete PRN   N: start once passes dysphagia screen  GI: none needed at this time   DVT: Lovenox    Dispo: F  Code Status: Full.

## 2020-11-13 NOTE — DISCHARGE NOTE PROVIDER - NSDCFUADDAPPT_GEN_ALL_CORE_FT
I attempted to schedule an appointment with Dr. Francisco, however the office is closed.     Please make an appointment with Dr. Francisco within 1-2 weeks of discharge.     We will follow up on Monday to make sure an appointment is made.

## 2020-11-14 VITALS
HEART RATE: 84 BPM | RESPIRATION RATE: 16 BRPM | DIASTOLIC BLOOD PRESSURE: 77 MMHG | SYSTOLIC BLOOD PRESSURE: 120 MMHG | TEMPERATURE: 98 F | OXYGEN SATURATION: 95 %

## 2020-11-14 PROCEDURE — 99239 HOSP IP/OBS DSCHRG MGMT >30: CPT | Mod: GC

## 2020-11-14 RX ORDER — CEFPODOXIME PROXETIL 100 MG
1 TABLET ORAL
Qty: 12 | Refills: 0
Start: 2020-11-14 | End: 2020-11-19

## 2020-11-17 LAB
CULTURE RESULTS: SIGNIFICANT CHANGE UP
CULTURE RESULTS: SIGNIFICANT CHANGE UP
SPECIMEN SOURCE: SIGNIFICANT CHANGE UP
SPECIMEN SOURCE: SIGNIFICANT CHANGE UP

## 2020-11-18 DIAGNOSIS — R29.6 REPEATED FALLS: ICD-10-CM

## 2020-11-18 DIAGNOSIS — T43.595A ADVERSE EFFECT OF OTHER ANTIPSYCHOTICS AND NEUROLEPTICS, INITIAL ENCOUNTER: ICD-10-CM
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
## 2020-11-18 DIAGNOSIS — J44.0 CHRONIC OBSTRUCTIVE PULMONARY DISEASE WITH (ACUTE) LOWER RESPIRATORY INFECTION: ICD-10-CM

## 2020-11-18 DIAGNOSIS — J18.9 PNEUMONIA, UNSPECIFIED ORGANISM: ICD-10-CM

## 2020-11-18 DIAGNOSIS — H54.7 UNSPECIFIED VISUAL LOSS: ICD-10-CM

## 2020-11-18 DIAGNOSIS — M81.0 AGE-RELATED OSTEOPOROSIS WITHOUT CURRENT PATHOLOGICAL FRACTURE: ICD-10-CM

## 2020-11-18 DIAGNOSIS — N39.0 URINARY TRACT INFECTION, SITE NOT SPECIFIED: ICD-10-CM

## 2020-11-18 DIAGNOSIS — R41.82 ALTERED MENTAL STATUS, UNSPECIFIED: ICD-10-CM

## 2020-11-18 DIAGNOSIS — Z74.01 BED CONFINEMENT STATUS: ICD-10-CM

## 2020-11-18 DIAGNOSIS — F32.9 MAJOR DEPRESSIVE DISORDER, SINGLE EPISODE, UNSPECIFIED: ICD-10-CM

## 2020-11-18 DIAGNOSIS — E04.1 NONTOXIC SINGLE THYROID NODULE: ICD-10-CM

## 2020-11-18 DIAGNOSIS — A41.9 SEPSIS, UNSPECIFIED ORGANISM: ICD-10-CM

## 2023-02-12 NOTE — ED PROVIDER NOTE - CHPI ED SYMPTOMS NEG
no nausea, no chills/no vomiting/no fever Negative no deformity/no bleeding/no vomiting/no nausea, no chills/no confusion/no abrasion/no fever

## 2023-04-21 VITALS
HEIGHT: 69 IN | RESPIRATION RATE: 18 BRPM | WEIGHT: 190.04 LBS | HEART RATE: 106 BPM | TEMPERATURE: 98 F | DIASTOLIC BLOOD PRESSURE: 68 MMHG | OXYGEN SATURATION: 95 % | SYSTOLIC BLOOD PRESSURE: 95 MMHG

## 2023-04-21 PROBLEM — Z87.09 PERSONAL HISTORY OF OTHER DISEASES OF THE RESPIRATORY SYSTEM: Chronic | Status: ACTIVE | Noted: 2020-11-13

## 2023-04-21 LAB
ALBUMIN SERPL ELPH-MCNC: 3.2 G/DL — LOW (ref 3.4–5)
ALP SERPL-CCNC: 99 U/L — SIGNIFICANT CHANGE UP (ref 40–120)
ALT FLD-CCNC: 25 U/L — SIGNIFICANT CHANGE UP (ref 12–42)
ANION GAP SERPL CALC-SCNC: 9 MMOL/L — SIGNIFICANT CHANGE UP (ref 9–16)
APPEARANCE UR: CLEAR — SIGNIFICANT CHANGE UP
APTT BLD: 30.7 SEC — SIGNIFICANT CHANGE UP (ref 27.5–35.5)
AST SERPL-CCNC: 25 U/L — SIGNIFICANT CHANGE UP (ref 15–37)
BASOPHILS # BLD AUTO: 0.06 K/UL — SIGNIFICANT CHANGE UP (ref 0–0.2)
BASOPHILS NFR BLD AUTO: 0.6 % — SIGNIFICANT CHANGE UP (ref 0–2)
BILIRUB SERPL-MCNC: 0.3 MG/DL — SIGNIFICANT CHANGE UP (ref 0.2–1.2)
BILIRUB UR-MCNC: NEGATIVE — SIGNIFICANT CHANGE UP
BUN SERPL-MCNC: 24 MG/DL — HIGH (ref 7–23)
CALCIUM SERPL-MCNC: 9.8 MG/DL — SIGNIFICANT CHANGE UP (ref 8.5–10.5)
CHLORIDE SERPL-SCNC: 103 MMOL/L — SIGNIFICANT CHANGE UP (ref 96–108)
CO2 SERPL-SCNC: 25 MMOL/L — SIGNIFICANT CHANGE UP (ref 22–31)
COLOR SPEC: YELLOW — SIGNIFICANT CHANGE UP
CREAT SERPL-MCNC: 1.01 MG/DL — SIGNIFICANT CHANGE UP (ref 0.5–1.3)
DIFF PNL FLD: NEGATIVE — SIGNIFICANT CHANGE UP
EGFR: 58 ML/MIN/1.73M2 — LOW
EOSINOPHIL # BLD AUTO: 0.14 K/UL — SIGNIFICANT CHANGE UP (ref 0–0.5)
EOSINOPHIL NFR BLD AUTO: 1.5 % — SIGNIFICANT CHANGE UP (ref 0–6)
FLUAV AG NPH QL: SIGNIFICANT CHANGE UP
FLUBV AG NPH QL: SIGNIFICANT CHANGE UP
GLUCOSE SERPL-MCNC: 132 MG/DL — HIGH (ref 70–99)
GLUCOSE UR QL: NEGATIVE — SIGNIFICANT CHANGE UP
HCT VFR BLD CALC: 44.6 % — SIGNIFICANT CHANGE UP (ref 34.5–45)
HGB BLD-MCNC: 14.5 G/DL — SIGNIFICANT CHANGE UP (ref 11.5–15.5)
IMM GRANULOCYTES NFR BLD AUTO: 0.3 % — SIGNIFICANT CHANGE UP (ref 0–0.9)
INR BLD: 1.09 — SIGNIFICANT CHANGE UP (ref 0.88–1.16)
KETONES UR-MCNC: NEGATIVE — SIGNIFICANT CHANGE UP
LEUKOCYTE ESTERASE UR-ACNC: NEGATIVE — SIGNIFICANT CHANGE UP
LYMPHOCYTES # BLD AUTO: 1.89 K/UL — SIGNIFICANT CHANGE UP (ref 1–3.3)
LYMPHOCYTES # BLD AUTO: 20.3 % — SIGNIFICANT CHANGE UP (ref 13–44)
MCHC RBC-ENTMCNC: 30.7 PG — SIGNIFICANT CHANGE UP (ref 27–34)
MCHC RBC-ENTMCNC: 32.5 GM/DL — SIGNIFICANT CHANGE UP (ref 32–36)
MCV RBC AUTO: 94.3 FL — SIGNIFICANT CHANGE UP (ref 80–100)
MONOCYTES # BLD AUTO: 0.65 K/UL — SIGNIFICANT CHANGE UP (ref 0–0.9)
MONOCYTES NFR BLD AUTO: 7 % — SIGNIFICANT CHANGE UP (ref 2–14)
NEUTROPHILS # BLD AUTO: 6.52 K/UL — SIGNIFICANT CHANGE UP (ref 1.8–7.4)
NEUTROPHILS NFR BLD AUTO: 70.3 % — SIGNIFICANT CHANGE UP (ref 43–77)
NITRITE UR-MCNC: NEGATIVE — SIGNIFICANT CHANGE UP
NRBC # BLD: 0 /100 WBCS — SIGNIFICANT CHANGE UP (ref 0–0)
PH UR: 5.5 — SIGNIFICANT CHANGE UP (ref 5–8)
PLATELET # BLD AUTO: 269 K/UL — SIGNIFICANT CHANGE UP (ref 150–400)
POTASSIUM SERPL-MCNC: 4.2 MMOL/L — SIGNIFICANT CHANGE UP (ref 3.5–5.3)
POTASSIUM SERPL-SCNC: 4.2 MMOL/L — SIGNIFICANT CHANGE UP (ref 3.5–5.3)
PROT SERPL-MCNC: 8.2 G/DL — SIGNIFICANT CHANGE UP (ref 6.4–8.2)
PROT UR-MCNC: NEGATIVE MG/DL — SIGNIFICANT CHANGE UP
PROTHROM AB SERPL-ACNC: 12.6 SEC — SIGNIFICANT CHANGE UP (ref 10.5–13.4)
RBC # BLD: 4.73 M/UL — SIGNIFICANT CHANGE UP (ref 3.8–5.2)
RBC # FLD: 13.5 % — SIGNIFICANT CHANGE UP (ref 10.3–14.5)
RSV RNA NPH QL NAA+NON-PROBE: SIGNIFICANT CHANGE UP
SARS-COV-2 RNA SPEC QL NAA+PROBE: SIGNIFICANT CHANGE UP
SODIUM SERPL-SCNC: 137 MMOL/L — SIGNIFICANT CHANGE UP (ref 132–145)
SP GR SPEC: >=1.03 — SIGNIFICANT CHANGE UP (ref 1–1.03)
UROBILINOGEN FLD QL: 0.2 E.U./DL — SIGNIFICANT CHANGE UP
WBC # BLD: 9.29 K/UL — SIGNIFICANT CHANGE UP (ref 3.8–10.5)
WBC # FLD AUTO: 9.29 K/UL — SIGNIFICANT CHANGE UP (ref 3.8–10.5)

## 2023-04-21 PROCEDURE — 70450 CT HEAD/BRAIN W/O DYE: CPT | Mod: 26

## 2023-04-21 PROCEDURE — 71045 X-RAY EXAM CHEST 1 VIEW: CPT | Mod: 26

## 2023-04-21 RX ORDER — SODIUM CHLORIDE 9 MG/ML
1000 INJECTION INTRAMUSCULAR; INTRAVENOUS; SUBCUTANEOUS ONCE
Refills: 0 | Status: COMPLETED | OUTPATIENT
Start: 2023-04-21 | End: 2023-04-21

## 2023-04-21 RX ORDER — HALOPERIDOL DECANOATE 100 MG/ML
2 INJECTION INTRAMUSCULAR ONCE
Refills: 0 | Status: COMPLETED | OUTPATIENT
Start: 2023-04-21 | End: 2023-04-21

## 2023-04-21 RX ADMIN — HALOPERIDOL DECANOATE 2 MILLIGRAM(S): 100 INJECTION INTRAMUSCULAR at 19:49

## 2023-04-21 RX ADMIN — SODIUM CHLORIDE 1000 MILLILITER(S): 9 INJECTION INTRAMUSCULAR; INTRAVENOUS; SUBCUTANEOUS at 16:23

## 2023-04-21 NOTE — ED ADULT NURSE NOTE - CAS TRG GENERAL AIRWAY, MLM

## 2023-04-21 NOTE — ED ADULT NURSE NOTE - OBJECTIVE STATEMENT
pt is 74y female, BIBA from home for inability to take care of self and refusing home health aid services, pt arrives a&o to person and place only but able to answer most questions, currently on 2l o2 as per EMS pt was noted with spo2 at 90% at the scene, pt denies any need for o2 at baseline, also denies any CP or SOB, normal WOB, pt reports she is bedbound and unable to move her legs, sensation intact in both legs and no obivous swelling noted, cont telemetry monitoring in place, NAD present at this time

## 2023-04-21 NOTE — ED ADULT NURSE NOTE - NSIMPLEMENTINTERV_GEN_ALL_ED
Implemented All Fall Risk Interventions:  [place] to call system. Call bell, personal items and telephone within reach. Instruct patient to call for assistance. Room bathroom lighting operational. Non-slip footwear when patient is off stretcher. Physically safe environment: no spills, clutter or unnecessary equipment. Stretcher in lowest position, wheels locked, appropriate side rails in place. Provide visual cue, wrist band, yellow gown, etc. Monitor gait and stability. Monitor for mental status changes and reorient to person, place, and time. Review medications for side effects contributing to fall risk. Reinforce activity limits and safety measures with patient and family.

## 2023-04-21 NOTE — ED PROVIDER NOTE - PROGRESS NOTE DETAILS
Patient is resting comfortably, NAD. NSS bolus ordered. No code sepsis called because patient does not meet SIRS criteria. Patient could not provide name/number of daughters or PMD. Patient's boyfriend Home is in the ED. Put me in touch with patient's daughter Otis [phone]. She said patient has not gotten out of bed in 4 weeks but only has 4 hours of home care per day. Has not been allowing aides to clean her. Boyfriend is moving out soon.  tried to track down contact information for patient but was unable to locate any. Patient not cooperative with straight cath or CT head. Will give haldol. Patient's boyfriend Home is in the ED. Put me in touch with patient's daughter Otis [phone]. She said patient has not gotten out of bed in 4 weeks but only has 4 hours of home care per day. Has not been allowing aides to clean her. Boyfriend is moving out soon. Patient has been becoming gradually more confused for the past 10-11 months. CTC contacted to initiate transfer Patient signed out to Dr. Villatoro. Awaiting call back from hospitalist for admission. Spoke with Dr. Edouard. She will accept patient if CTH is normal. Patient signed out to Dr. Villatoro. Awaiting CT head reading zaire: CT resulted no acute findings, patient has recently been straight cathed for urine and waiting results.  I attempted to call the transfer center for admission to the hospitalist and am awaiting a callback. dr bennett, aware of neg ct and neg ua. accepted for regional medicine admission.

## 2023-04-21 NOTE — ED PROVIDER NOTE - PHYSICAL EXAMINATION
Gen: Well-developed, NAD. oriented to person and place, not time (knows year but not month) HEENT: NCAT, mm dry  Chest: tachycardic, regular, nl S1 and S2, no m/r/g. Resp: CTAB, no w/r/r  Abd: nl BS, soft, nt/nd. Ext: Warm, dry  Neuro: CN II-XII intact, normal and equal strength, sensation, and reflexes bilaterally, normal gait

## 2023-04-21 NOTE — ED PROVIDER NOTE - OBJECTIVE STATEMENT
Per EMS, patient's HHA called 911 because patient was refusing to allow herself to be cared for. Patient is bedbound. Patient said there was someone in her apartment who she didn't know. Possibly new aide, as EMS said aide did not know patient's medical conditions. No fever, cp, sob, ap, n/v/d.

## 2023-04-22 ENCOUNTER — INPATIENT (INPATIENT)
Facility: HOSPITAL | Age: 78
LOS: 1 days | Discharge: HOME HEALTH | DRG: 884 | End: 2023-04-24
Attending: INTERNAL MEDICINE | Admitting: STUDENT IN AN ORGANIZED HEALTH CARE EDUCATION/TRAINING PROGRAM
Payer: MEDICARE

## 2023-04-22 ENCOUNTER — TRANSCRIPTION ENCOUNTER (OUTPATIENT)
Age: 78
End: 2023-04-22

## 2023-04-22 DIAGNOSIS — R62.7 ADULT FAILURE TO THRIVE: ICD-10-CM

## 2023-04-22 DIAGNOSIS — R79.89 OTHER SPECIFIED ABNORMAL FINDINGS OF BLOOD CHEMISTRY: ICD-10-CM

## 2023-04-22 DIAGNOSIS — F41.9 ANXIETY DISORDER, UNSPECIFIED: ICD-10-CM

## 2023-04-22 DIAGNOSIS — Z96.649 PRESENCE OF UNSPECIFIED ARTIFICIAL HIP JOINT: Chronic | ICD-10-CM

## 2023-04-22 DIAGNOSIS — Z29.9 ENCOUNTER FOR PROPHYLACTIC MEASURES, UNSPECIFIED: ICD-10-CM

## 2023-04-22 DIAGNOSIS — E78.5 HYPERLIPIDEMIA, UNSPECIFIED: ICD-10-CM

## 2023-04-22 DIAGNOSIS — Z87.09 PERSONAL HISTORY OF OTHER DISEASES OF THE RESPIRATORY SYSTEM: ICD-10-CM

## 2023-04-22 LAB
ALBUMIN SERPL ELPH-MCNC: 3.3 G/DL — SIGNIFICANT CHANGE UP (ref 3.3–5)
ALP SERPL-CCNC: 86 U/L — SIGNIFICANT CHANGE UP (ref 40–120)
ALT FLD-CCNC: 14 U/L — SIGNIFICANT CHANGE UP (ref 10–45)
ANION GAP SERPL CALC-SCNC: 8 MMOL/L — SIGNIFICANT CHANGE UP (ref 5–17)
APTT BLD: 34 SEC — SIGNIFICANT CHANGE UP (ref 27.5–35.5)
AST SERPL-CCNC: 16 U/L — SIGNIFICANT CHANGE UP (ref 10–40)
BASOPHILS # BLD AUTO: 0.05 K/UL — SIGNIFICANT CHANGE UP (ref 0–0.2)
BASOPHILS NFR BLD AUTO: 0.6 % — SIGNIFICANT CHANGE UP (ref 0–2)
BILIRUB SERPL-MCNC: 0.3 MG/DL — SIGNIFICANT CHANGE UP (ref 0.2–1.2)
BUN SERPL-MCNC: 20 MG/DL — SIGNIFICANT CHANGE UP (ref 7–23)
CALCIUM SERPL-MCNC: 8.8 MG/DL — SIGNIFICANT CHANGE UP (ref 8.4–10.5)
CHLORIDE SERPL-SCNC: 106 MMOL/L — SIGNIFICANT CHANGE UP (ref 96–108)
CHOLEST SERPL-MCNC: 146 MG/DL — SIGNIFICANT CHANGE UP
CK SERPL-CCNC: 60 U/L — SIGNIFICANT CHANGE UP (ref 25–170)
CO2 SERPL-SCNC: 26 MMOL/L — SIGNIFICANT CHANGE UP (ref 22–31)
CREAT SERPL-MCNC: 0.92 MG/DL — SIGNIFICANT CHANGE UP (ref 0.5–1.3)
CULTURE RESULTS: SIGNIFICANT CHANGE UP
D DIMER BLD IA.RAPID-MCNC: 2444 NG/ML DDU — HIGH
EGFR: 65 ML/MIN/1.73M2 — SIGNIFICANT CHANGE UP
EOSINOPHIL # BLD AUTO: 0.18 K/UL — SIGNIFICANT CHANGE UP (ref 0–0.5)
EOSINOPHIL NFR BLD AUTO: 2.1 % — SIGNIFICANT CHANGE UP (ref 0–6)
GLUCOSE SERPL-MCNC: 92 MG/DL — SIGNIFICANT CHANGE UP (ref 70–99)
HAV IGM SER-ACNC: SIGNIFICANT CHANGE UP
HBV CORE IGM SER-ACNC: SIGNIFICANT CHANGE UP
HBV SURFACE AG SER-ACNC: SIGNIFICANT CHANGE UP
HCT VFR BLD CALC: 40.1 % — SIGNIFICANT CHANGE UP (ref 34.5–45)
HDLC SERPL-MCNC: 29 MG/DL — LOW
HGB BLD-MCNC: 12.8 G/DL — SIGNIFICANT CHANGE UP (ref 11.5–15.5)
IMM GRANULOCYTES NFR BLD AUTO: 0.4 % — SIGNIFICANT CHANGE UP (ref 0–0.9)
INR BLD: 1.12 — SIGNIFICANT CHANGE UP (ref 0.88–1.16)
LIPID PNL WITH DIRECT LDL SERPL: 90 MG/DL — SIGNIFICANT CHANGE UP
LYMPHOCYTES # BLD AUTO: 1.64 K/UL — SIGNIFICANT CHANGE UP (ref 1–3.3)
LYMPHOCYTES # BLD AUTO: 19.2 % — SIGNIFICANT CHANGE UP (ref 13–44)
MAGNESIUM SERPL-MCNC: 1.7 MG/DL — SIGNIFICANT CHANGE UP (ref 1.6–2.6)
MCHC RBC-ENTMCNC: 30.7 PG — SIGNIFICANT CHANGE UP (ref 27–34)
MCHC RBC-ENTMCNC: 31.9 GM/DL — LOW (ref 32–36)
MCV RBC AUTO: 96.2 FL — SIGNIFICANT CHANGE UP (ref 80–100)
MONOCYTES # BLD AUTO: 0.62 K/UL — SIGNIFICANT CHANGE UP (ref 0–0.9)
MONOCYTES NFR BLD AUTO: 7.2 % — SIGNIFICANT CHANGE UP (ref 2–14)
NEUTROPHILS # BLD AUTO: 6.04 K/UL — SIGNIFICANT CHANGE UP (ref 1.8–7.4)
NEUTROPHILS NFR BLD AUTO: 70.5 % — SIGNIFICANT CHANGE UP (ref 43–77)
NON HDL CHOLESTEROL: 117 MG/DL — SIGNIFICANT CHANGE UP
NRBC # BLD: 0 /100 WBCS — SIGNIFICANT CHANGE UP (ref 0–0)
NT-PROBNP SERPL-SCNC: 241 PG/ML — SIGNIFICANT CHANGE UP (ref 0–300)
PHOSPHATE SERPL-MCNC: 4 MG/DL — SIGNIFICANT CHANGE UP (ref 2.5–4.5)
PLATELET # BLD AUTO: 232 K/UL — SIGNIFICANT CHANGE UP (ref 150–400)
POTASSIUM SERPL-MCNC: 4.3 MMOL/L — SIGNIFICANT CHANGE UP (ref 3.5–5.3)
POTASSIUM SERPL-SCNC: 4.3 MMOL/L — SIGNIFICANT CHANGE UP (ref 3.5–5.3)
PROT SERPL-MCNC: 6.3 G/DL — SIGNIFICANT CHANGE UP (ref 6–8.3)
PROTHROM AB SERPL-ACNC: 13.4 SEC — SIGNIFICANT CHANGE UP (ref 10.5–13.4)
RBC # BLD: 4.17 M/UL — SIGNIFICANT CHANGE UP (ref 3.8–5.2)
RBC # FLD: 13.7 % — SIGNIFICANT CHANGE UP (ref 10.3–14.5)
SODIUM SERPL-SCNC: 140 MMOL/L — SIGNIFICANT CHANGE UP (ref 135–145)
SPECIMEN SOURCE: SIGNIFICANT CHANGE UP
TRIGL SERPL-MCNC: 134 MG/DL — SIGNIFICANT CHANGE UP
TSH SERPL-MCNC: 1.78 UIU/ML — SIGNIFICANT CHANGE UP (ref 0.27–4.2)
WBC # BLD: 8.56 K/UL — SIGNIFICANT CHANGE UP (ref 3.8–10.5)
WBC # FLD AUTO: 8.56 K/UL — SIGNIFICANT CHANGE UP (ref 3.8–10.5)

## 2023-04-22 PROCEDURE — 93970 EXTREMITY STUDY: CPT | Mod: 26

## 2023-04-22 PROCEDURE — 99291 CRITICAL CARE FIRST HOUR: CPT

## 2023-04-22 PROCEDURE — 99222 1ST HOSP IP/OBS MODERATE 55: CPT | Mod: GC

## 2023-04-22 RX ORDER — ACETAMINOPHEN 500 MG
650 TABLET ORAL EVERY 6 HOURS
Refills: 0 | Status: DISCONTINUED | OUTPATIENT
Start: 2023-04-22 | End: 2023-04-24

## 2023-04-22 RX ORDER — ATORVASTATIN CALCIUM 80 MG/1
1 TABLET, FILM COATED ORAL
Refills: 0 | DISCHARGE

## 2023-04-22 RX ORDER — ALBUTEROL 90 UG/1
3 AEROSOL, METERED ORAL
Refills: 0 | DISCHARGE

## 2023-04-22 RX ORDER — ASPIRIN/CALCIUM CARB/MAGNESIUM 324 MG
81 TABLET ORAL DAILY
Refills: 0 | Status: DISCONTINUED | OUTPATIENT
Start: 2023-04-22 | End: 2023-04-24

## 2023-04-22 RX ORDER — ENOXAPARIN SODIUM 100 MG/ML
40 INJECTION SUBCUTANEOUS EVERY 24 HOURS
Refills: 0 | Status: DISCONTINUED | OUTPATIENT
Start: 2023-04-22 | End: 2023-04-24

## 2023-04-22 RX ORDER — TIOTROPIUM BROMIDE 18 UG/1
2 CAPSULE ORAL; RESPIRATORY (INHALATION) DAILY
Refills: 0 | Status: DISCONTINUED | OUTPATIENT
Start: 2023-04-22 | End: 2023-04-24

## 2023-04-22 RX ORDER — ASPIRIN/CALCIUM CARB/MAGNESIUM 324 MG
1 TABLET ORAL
Qty: 30 | Refills: 0
Start: 2023-04-22 | End: 2023-05-21

## 2023-04-22 RX ORDER — LANOLIN ALCOHOL/MO/W.PET/CERES
3 CREAM (GRAM) TOPICAL AT BEDTIME
Refills: 0 | Status: DISCONTINUED | OUTPATIENT
Start: 2023-04-22 | End: 2023-04-24

## 2023-04-22 RX ORDER — ATORVASTATIN CALCIUM 80 MG/1
40 TABLET, FILM COATED ORAL AT BEDTIME
Refills: 0 | Status: DISCONTINUED | OUTPATIENT
Start: 2023-04-22 | End: 2023-04-22

## 2023-04-22 RX ORDER — HYDROXYZINE HCL 10 MG
1 TABLET ORAL
Refills: 0 | DISCHARGE

## 2023-04-22 RX ORDER — DULOXETINE HYDROCHLORIDE 30 MG/1
1 CAPSULE, DELAYED RELEASE ORAL
Refills: 0 | DISCHARGE

## 2023-04-22 RX ORDER — UMECLIDINIUM 62.5 UG/1
1 AEROSOL, POWDER ORAL
Refills: 0 | DISCHARGE

## 2023-04-22 RX ORDER — ATORVASTATIN CALCIUM 80 MG/1
1 TABLET, FILM COATED ORAL
Qty: 30 | Refills: 0
Start: 2023-04-22 | End: 2023-05-21

## 2023-04-22 RX ORDER — ATORVASTATIN CALCIUM 80 MG/1
80 TABLET, FILM COATED ORAL AT BEDTIME
Refills: 0 | Status: DISCONTINUED | OUTPATIENT
Start: 2023-04-22 | End: 2023-04-24

## 2023-04-22 RX ADMIN — Medication 81 MILLIGRAM(S): at 13:48

## 2023-04-22 RX ADMIN — TIOTROPIUM BROMIDE 2 PUFF(S): 18 CAPSULE ORAL; RESPIRATORY (INHALATION) at 13:11

## 2023-04-22 RX ADMIN — ATORVASTATIN CALCIUM 80 MILLIGRAM(S): 80 TABLET, FILM COATED ORAL at 23:04

## 2023-04-22 RX ADMIN — ENOXAPARIN SODIUM 40 MILLIGRAM(S): 100 INJECTION SUBCUTANEOUS at 06:37

## 2023-04-22 NOTE — ED ADULT NURSE REASSESSMENT NOTE - NS ED NURSE REASSESS COMMENT FT1
patient left with EMS to [facility] for admission. patient left alert verbal; able to make needs known on 2LNC

## 2023-04-22 NOTE — PHYSICAL THERAPY INITIAL EVALUATION ADULT - PERTINENT HX OF CURRENT PROBLEM, REHAB EVAL
74F w/ PMHx of osteoporosis, depression, COPD, left eye blindness, and progressively worsening bed bound status. EMS called because patient initially refused treatment from A. Per patient discussion there was some confusion regarding how HHA entered house, patient was subsequently fearful, thus refused treatment. Admitted for FTT (poor PO intake x 72h) and worsening bedbound status.

## 2023-04-22 NOTE — DISCHARGE NOTE PROVIDER - ATTENDING DISCHARGE PHYSICAL EXAMINATION:
Patient presented after she didn't allow HHA to care for her. Patient remained stable, denies headache, no fevers, no chills, no N/V. Denies SOB or pleuritic chest pain, denies urinary symptoms. Patient evaluated by PT, recommended for SHANNAN however, she is refusing. Patient AOx3, able to participate in her care and make decision.    General: AOX3, NAD, lying in bed, speaking in full sentences, no labored breathing on RA  HEENT: AT/NC, no facial asymmetry, left eye blindness  Lungs: no crackles, no wheezes  Heart: RRR  Abdomen soft, non-tender, + BS  Extremities: warm, no edema, no calf tenderness, no focal deficit    No acute DVT on Doppler US LE, post thrombotic chronic changes in right popliteal vein  Continue on home medications  PCP Follow-up  Patient's daughter visiting from [place], boyfriend lives with patient. She is refusing SHANNAN, HHA hours increase recommended.

## 2023-04-22 NOTE — CONSULT NOTE ADULT - SUBJECTIVE AND OBJECTIVE BOX
Patient is a 77y old  Female who presents with a chief complaint of FTT (22 Apr 2023 04:08)      HPI:  74F w/ PMHx of osteoporosis, depression, COPD, left eye blindness, and progressively worsening bed bound status. Per EMS, patient's HHA called 911 because patient was refusing to allow herself to be cared for. Patient is bedbound. Patient said there was someone in her apartment who she didn't know. Possibly new aide, as EMS said aide did not know patient's medical conditions. No fever, cp, sob, ap, n/v/d.    Patient required haldol to complete CTH in the ED. Patient's daughter Otis [phone] reported patient had not gotten out of bed in 4 weeks but only has 4 hours of home care per day. Has not been allowing aides to clean her. Boyfriend Home is moving out soon. Patient has been becoming gradually more confused for the past 10-11 months.     Vitals on admission: T 97.9F, , BP 95/68, RR: 18, SpO2: 95% on 2LNC    Labs significant for: WBC 9.29, Hgb 14.5, MCV 94.3, plt 269, BUN 24, Cr 1.01 (baseline 0.95 from 2020), glucose 132, Ca corrected 10.4, protein 8.2, albumin 3.2, UA negative, COVID/Flu A/Flu B/RSV negative, electrolytes grossly normal    EKG: Sinus rhythm with PACs    CXR: increased pulmonary vascular markings     CTH: No acute intracranial injury. Moderate to severe microvascular disease. Chronic lacunar infarcts.    Medications: 1LNS, haloperidol 2mg IM  (22 Apr 2023 04:08)    PAST MEDICAL & SURGICAL HISTORY:  History of COPD      History of hip replacement        MEDICATIONS  (STANDING):  atorvastatin 40 milliGRAM(s) Oral at bedtime  enoxaparin Injectable 40 milliGRAM(s) SubCutaneous every 24 hours  tiotropium 2.5 MICROgram(s) Inhaler 2 Puff(s) Inhalation daily    MEDICATIONS  (PRN):  acetaminophen     Tablet .. 650 milliGRAM(s) Oral every 6 hours PRN Temp greater or equal to 38C (100.4F), Mild Pain (1 - 3)  melatonin 3 milliGRAM(s) Oral at bedtime PRN Insomnia            FAMILY HISTORY:  FH: cancer  mother and father        CBC Full  -  ( 22 Apr 2023 10:29 )  WBC Count : 8.56 K/uL  RBC Count : 4.17 M/uL  Hemoglobin : 12.8 g/dL  Hematocrit : 40.1 %  Platelet Count - Automated : 232 K/uL  Mean Cell Volume : 96.2 fl  Mean Cell Hemoglobin : 30.7 pg  Mean Cell Hemoglobin Concentration : 31.9 gm/dL  Auto Neutrophil # : 6.04 K/uL  Auto Lymphocyte # : 1.64 K/uL  Auto Monocyte # : 0.62 K/uL  Auto Eosinophil # : 0.18 K/uL  Auto Basophil # : 0.05 K/uL  Auto Neutrophil % : 70.5 %  Auto Lymphocyte % : 19.2 %  Auto Monocyte % : 7.2 %  Auto Eosinophil % : 2.1 %  Auto Basophil % : 0.6 %      04-22    140  |  106  |  20  ----------------------------<  92  4.3   |  26  |  0.92    Ca    8.8      22 Apr 2023 10:29  Phos  4.0     04-22  Mg     1.7     04-22    TPro  6.3  /  Alb  3.3  /  TBili  0.3  /  DBili  x   /  AST  16  /  ALT  14  /  AlkPhos  86  04-22      Urinalysis Basic - ( 21 Apr 2023 15:29 )    Color: Yellow / Appearance: Clear / SG: >=1.030 / pH: x  Gluc: x / Ketone: NEGATIVE  / Bili: NEGATIVE / Urobili: 0.2 E.U./dL   Blood: x / Protein: NEGATIVE mg/dL / Nitrite: NEGATIVE   Leuk Esterase: NEGATIVE / RBC: x / WBC x   Sq Epi: x / Non Sq Epi: x / Bacteria: x        Radiology :     < from: Xray Chest 1 View AP/PA (04.21.23 @ 15:53) >  ACC: [id number] EXAM:  XR CHEST AP OR PA 1V   ORDERED BY: RUBIA MARTIN     PROCEDURE DATE:  04/21/2023          INTERPRETATION:  Clinical History: AMS    Frontal examination of the chest demonstrates the heart to be within   normal limits in transverse diameter. No acute infiltrates. Prominent   right hilum. No interval change noted comparison to prior examination of   the chest 11/13/2020.    IMPRESSION: Prominent right hilum. No interval change noted comparison to   prior examination of the chest 11/13/2020. No acute infiltrates    < end of copied text >      < from: CT Head No Cont (04.21.23 @ 20:48) >  ACC: [id number] EXAM:  CT BRAIN   ORDERED BY: RUBIA MARTIN     PROCEDURE DATE:  04/21/2023          INTERPRETATION:  Altered level of consciousness      Technique: CT head was performed utilizing axial images from the base of   the skull through the vertex without the administration of intravenous   contrast. Images were reviewed in the bone, brain and subdural windows.    Findings: Comparison is made to a prior CT of the brain performed on   11/12/2020.    There is no evidence of acute intracranial hemorrhage or acute   transcortical infarct. There are patchy and confluent areas of low   density throughout the white matter consistent with moderate to severe   microvascular disease. There is a left caudate and left putamen and left   thalamic/posterior limb of the internal capsule chronic lacunar infarcts.   The ventricles are normal in size and caliber.  There is no evidence of mass-effect or midline shift. There is no   evidence of an intra or extra-axial fluid collection.    Visualized paranasal sinuses and bilateral mastoid air cells are clear.    Impression: No acute intracranial injury. Moderate to severe   microvascular disease. Chronic lacunar infarcts.    < end of copied text >        REVIEW OF SYSTEMS:      CONSTITUTIONAL: No fever, weight loss, or fatigue  EYES: No eye pain, visual disturbances, or discharge  ENMT:  No difficulty hearing, tinnitus, vertigo; No sinus or throat pain  NECK: No pain or stiffness  BREASTS: No pain, masses, or nipple discharge  RESPIRATORY: No cough, wheezing, chills or hemoptysis; No shortness of breath  CARDIOVASCULAR: No chest pain, palpitations, dizziness, or leg swelling  GASTROINTESTINAL: No abdominal or epigastric pain. No nausea, vomiting, or hematemesis; No diarrhea or constipation. No melena or hematochezia.  GENITOURINARY: No dysuria, frequency, hematuria, or incontinence  NEUROLOGICAL: No headaches, memory loss, loss of strength, numbness, or tremors  SKIN: No itching, burning, rashes, or lesions   LYMPH NODES: No enlarged glands  ENDOCRINE: No heat or cold intolerance; No hair loss  MUSCULOSKELETAL: No joint pain or swelling; No muscle, back, or extremity pain  PSYCHIATRIC: No depression, anxiety, mood swings, or difficulty sleeping  HEME/LYMPH: No easy bruising, or bleeding gums  ALLERGY AND IMMUNOLOGIC: No hives or eczema  VASCULAR: no swelling , erythema          Vital Signs Last 24 Hrs  T(C): 36.4 (22 Apr 2023 06:34), Max: 37 (22 Apr 2023 02:41)  T(F): 97.6 (22 Apr 2023 06:34), Max: 98.6 (22 Apr 2023 02:41)  HR: 92 (22 Apr 2023 06:34) (79 - 106)  BP: 98/62 (22 Apr 2023 06:34) (95/68 - 118/71)  BP(mean): --  RR: 19 (22 Apr 2023 06:34) (16 - 19)  SpO2: 95% (22 Apr 2023 06:34) (94% - 96%)    Parameters below as of 22 Apr 2023 06:34  Patient On (Oxygen Delivery Method): nasal cannula  O2 Flow (L/min): 2          Physical Exam : 77 y o woman  lying comfortably in semi Paez's position , awake ,  NAD     Head : normocephalic , atraumatic    Eyes : PERRLA , EOMI , no nystagmus , sclera anicteric    ENT : neg nasal discharge , uvula midline , no oropharyngeal erythema / exudate    Face: no ecchymosis , heamtoma     Neck : supple , negative JVD , negative carotid bruits , no thyromegaly    Chest : CTA bilaterally , neg wheeze / rhonchi / rales / crackles / egophany    Cardiovascular : regular rate and rhythm , neg murmurs / rubs / gallops    Abdomen : soft , non distended , non tender to palpation in all 4 quadrants , normal bowel sounds     Extremities : WWP , neg cyanosis /clubbing / edema     Neurologic Exam :    Alert and oriented to person , place ,  speech fluent w/o dysarthria , follows commands     Cranial Nerves:     II :                         pupils equal , round and reactive to light , visual fields L eye blindness (chronic)   III/ IV/VI :              extraocular movements intact , neg nystagmus , neg ptosis  V :                        facial sensation intact , V1-3 normal  VII :                      face symmetric , no droop , normal eye closure and smile  VIII :                     hearing intact to finger rub bilaterally  IX and X :             no hoarseness , gag intact , palate/ uvula rise symmetrically  XI :                       SCM / trapezius strength intact bilateral  XII :                      no tongue deviation    Motor Exam:          Right UE:               no focal weakness ,  > 3+/5 throughout                             Left UE:                 no focal weakness ,  > 3+/5 throughout          Right LE:                no focal weakness ,  > 3+/5 throughout      Left LE:                  no focal weakness ,  > 3+/5 throughout         Sensation :         intact to light touch x 4 extremities       DTR :                     biceps/brachioradialis : equal                              patella/ankle : equal      Gait :  not tested      PM&R Impression :     1) deconditioned    2) no focal weakness         Recommendations / Plan :       1) Physical / Occupational therapy focusing on therapeutic exercises , equipment evaluation , bed mobility/transfer out of bed evaluation , progressive ambulation with assistive devices prn .    2) Current disposition plan recommendation  :    subacute rehab placement

## 2023-04-22 NOTE — DISCHARGE NOTE PROVIDER - NSDCMRMEDTOKEN_GEN_ALL_CORE_FT
albuterol 2.5 mg/3 mL (0.083%) inhalation solution: 3 by nebulizer 1 to 3 times a day as needed for  shortness of breath and/or wheezing  aspirin 81 mg oral tablet, chewable: 1 tab(s) orally once a day  atorvastatin 40 mg oral tablet: 1 orally once a day (at bedtime)  atorvastatin 80 mg oral tablet: 1 tab(s) orally once a day (at bedtime)  DULoxetine 30 mg oral delayed release capsule: 1 orally once a day  hydrOXYzine hydrochloride 25 mg oral tablet: 1 orally once a day  Incruse Ellipta 62.5 mcg/inh inhalation powder: inhaled once a day  pregabalin 100 mg oral capsule: 1 orally 2 times a day as needed for  moderate pain   albuterol 2.5 mg/3 mL (0.083%) inhalation solution: 3 by nebulizer 1 to 3 times a day as needed for  shortness of breath and/or wheezing  aspirin 81 mg oral tablet, chewable: 1 tab(s) orally once a day  atorvastatin 40 mg oral tablet: 1 orally once a day (at bedtime)  DULoxetine 30 mg oral delayed release capsule: 1 orally once a day  hydrOXYzine hydrochloride 25 mg oral tablet: 1 orally once a day  Incruse Ellipta 62.5 mcg/inh inhalation powder: inhaled once a day  pregabalin 100 mg oral capsule: 1 orally 2 times a day as needed for  moderate pain

## 2023-04-22 NOTE — DISCHARGE NOTE PROVIDER - NSDCFUADDAPPT_GEN_ALL_CORE_FT
Please call [phone] to confirm your follow-up appointment to establish care with a primary care physician within 1 week after you leave the hospital.

## 2023-04-22 NOTE — H&P ADULT - PROBLEM SELECTOR PLAN 5
F: tolerating PO  E: replete K<4, Mg<2  N: Regular  VTE ppx: Lovenox 40 Q24H  GI: not needed  D: RMF On home atorvastatin 40mg PO qHS  - f/u lipid profile  - increase atorvastatin to 80 mg PO qHs  - start on aspirin i/s/o bedbound status

## 2023-04-22 NOTE — H&P ADULT - PROBLEM SELECTOR PLAN 1
Patient normally ambulatory with walker. Per discussion, patient admits to decreased ambulatory status over the course of last 4 weeks. Patient receives 4h per day of HHA assistance, however, over the course of the last 48-72 hours patient has had minimal PO intake. When HHA arrived at patient's home today, was allowed in by patient's boyfriend (patient unaware) and patient subsequently refused HHA assistance due to fear. Deterioration likely multifactorial 2/2 dementia with decreased ability to ambulate at baseline. Less likely 2/2 infectious etiology: viral URI negative, CXR without consolidation, UA negative, no wheezing on examination. Less likely trauma as CTH negative for hemorrhage, no history of recent falls elucidated. Less likely paraneoplastic syndrome as no current malignancy present. Possibly 2/2 poorly controlled depression?   - f/u PT recs  - monitor electrolytes (Ca borderline)  - obtain collateral from o/p PMD Dr. Darrin Francisco  - f/u TSH, B12, folate, vit D D-dimer 2444 i/s/o bedbound status for the past 4 weeks, w/ significant TTP of b/l LE and mild b/l swelling. Pt also reports 28 falls in the past year.     -f/u dopplers of b/l LE  -f/u b/l LE Xray

## 2023-04-22 NOTE — H&P ADULT - PROBLEM SELECTOR PLAN 2
Without any signs of current exacerbation. On home incruse ellipta 1 puff qd. Patient notes remote history of lung cancer (cannot define time course, treatment, or pulmonologist).   - c/w home med (therapeutic interchange) Without any signs of current exacerbation. On home incruse ellipta 1 puff qd. Patient notes remote history of lung cancer (cannot define time course, treatment, or pulmonologist).   - c/w home med (therapeutic interchange)    #Protein gap  - f/u SPEP, UPEP, Hep panel Patient normally ambulatory with walker. Per discussion, patient admits to decreased ambulatory status over the course of last 4 weeks. Patient receives 4h per day of HHA assistance, however, over the course of the last 48-72 hours patient has had minimal PO intake. When HHA arrived at patient's home today, was allowed in by patient's boyfriend (patient unaware) and patient subsequently refused HHA assistance due to fear. Deterioration likely multifactorial 2/2 dementia with decreased ability to ambulate at baseline. Less likely 2/2 infectious etiology: viral URI negative, CXR without consolidation, UA negative, no wheezing on examination. Less likely trauma as CTH negative for hemorrhage, no history of recent falls elucidated. Less likely paraneoplastic syndrome as no current malignancy present. Possibly 2/2 poorly controlled depression?   - f/u PT recs  - monitor electrolytes (Ca borderline)  - obtain collateral from o/p PMD Dr. Darrin Francisco  - f/u TSH, B12, folate, vit D

## 2023-04-22 NOTE — DISCHARGE NOTE PROVIDER - NSDCCPCAREPLAN_GEN_ALL_CORE_FT
PRINCIPAL DISCHARGE DIAGNOSIS  Diagnosis: Altered mental status  Assessment and Plan of Treatment: Failure to thrive (FTT) happens when an older adult has a loss of appetite, eats and drinks less than usual, loses weight, and is less active than normal. He or she may not be interested in other people or social activities. He or she may also have memory loss, trouble thinking, and trouble with daily activities. Examples of daily activities include preparing meals, getting in and out of bed, walking, bathing, dressing, and using the bathroom.  You were admitted to the hospital for FTT, and were treated with IV fluids and physical therapy. You are being discharged home with home health aids to help you with your daily living tasks. Please make sure to follow-up with your PCP upon discharge for further care and management.      SECONDARY DISCHARGE DIAGNOSES  Diagnosis: Chronic deep vein thrombosis (DVT) of lower extremity  Assessment and Plan of Treatment: You were found to have a chronic, non-obstructing DVT in your right leg. This is likely due to spending extended amounts of time in bed. You were started on _______ to help prevent further clots. Please make sure to follow-up with your PCP upon discharge for further management.     PRINCIPAL DISCHARGE DIAGNOSIS  Diagnosis: Altered mental status  Assessment and Plan of Treatment: Failure to thrive (FTT) happens when an older adult has a loss of appetite, eats and drinks less than usual, loses weight, and is less active than normal. He or she may not be interested in other people or social activities. He or she may also have memory loss, trouble thinking, and trouble with daily activities. Examples of daily activities include preparing meals, getting in and out of bed, walking, bathing, dressing, and using the bathroom.  You were admitted to the hospital for FTT, and were treated with IV fluids and physical therapy. You are being discharged home with home health aids to help you with your daily living tasks. Please make sure to follow-up with your PCP upon discharge for further care and management.      SECONDARY DISCHARGE DIAGNOSES  Diagnosis: Chronic deep vein thrombosis (DVT) of lower extremity  Assessment and Plan of Treatment: You were found to have a chronic, non-obstructing DVT in your right leg. This is likely due to spending extended amounts of time in bed. You were not started on anti-coagulation as this is an old clot. Please make sure to follow-up with your PCP upon discharge for further management.

## 2023-04-22 NOTE — PATIENT PROFILE ADULT - FALL HARM RISK - HARM RISK INTERVENTIONS

## 2023-04-22 NOTE — DISCHARGE NOTE PROVIDER - CARE PROVIDER_API CALL
Riri Shultz)  Internal Medicine  [address], [address] [place] [address]  Phone: [phone]  Fax: [phone]  Follow Up Time: 1 week

## 2023-04-22 NOTE — PHYSICAL THERAPY INITIAL EVALUATION ADULT - NSPTDISCHREC_GEN_A_CORE
Subacute Rehab // Pt refusing SHANNAN, if pt were to return home, pt would require home PT and bedside commode

## 2023-04-22 NOTE — H&P ADULT - NSHPPHYSICALEXAM_GEN_ALL_CORE
.  VITAL SIGNS:  T(C): 37 (04-22-23 @ 02:41), Max: 37 (04-22-23 @ 02:41)  T(F): 98.6 (04-22-23 @ 02:41), Max: 98.6 (04-22-23 @ 02:41)  HR: 84 (04-22-23 @ 02:41) (79 - 106)  BP: 97/67 (04-22-23 @ 02:41) (95/68 - 118/71)  BP(mean): --  RR: 19 (04-22-23 @ 02:41) (16 - 19)  SpO2: 95% (04-22-23 @ 02:41) (94% - 96%)  Wt(kg): --    PHYSICAL EXAM:    Constitutional: WDWN resting comfortably in bed; NAD  Head: NC/AT  Eyes: PERRL, EOMI, anicteric sclera  ENT: no nasal discharge; uvula midline, no oropharyngeal erythema or exudates; MM dry  Neck: supple; no JVD or thyromegaly  Respiratory: CTA B/L; no W/R/R, no retractions  Cardiac: +S1/S2; RRR; no M/R/G; PMI non-displaced  Gastrointestinal: abdomen soft, NT/ND; no rebound or guarding; +BSx4  Back: spine midline, no bony tenderness or step-offs; no CVAT B/L  Extremities: WWP, no clubbing or cyanosis; no peripheral edema  Musculoskeletal: NROM x4; no joint swelling, tenderness or erythema  Vascular: 2+ radial, femoral, DP/PT pulses B/L  Dermatologic: skin warm, dry and intact; no rashes, wounds, or scars  Lymphatic: no submandibular or cervical LAD  Neurologic: AAOx1; CNII-XII grossly intact; no focal deficits  Psychiatric: affect and characteristics of appearance, verbalizations, behaviors are appropriate

## 2023-04-22 NOTE — CONSULT NOTE ADULT - ASSESSMENT
IM    74 y o F w/ PMHx of osteoporosis, depression, COPD, left eye blindness, and progressively worsening bed bound status. EMS called because patient initially refused treatment from HHA. Per patient discussion there was some confusion regarding how HHA entered house, patient was subsequently fearful, thus refused treatment. Admitted for FTT (poor PO intake x 72h) and worsening bedbound status.     Problem/Plan - 1:  ·  Problem: Adult failure to thrive.   ·  Plan: Patient normally ambulatory with walker. Per discussion, patient admits to decreased ambulatory status over the course of last 4 weeks. Patient receives 4h per day of HHA assistance, however, over the course of the last 48-72 hours patient has had minimal PO intake. When HHA arrived at patient's home today, was allowed in by patient's boyfriend (patient unaware) and patient subsequently refused HHA assistance due to fear. Deterioration likely multifactorial 2/2 dementia with decreased ability to ambulate at baseline. Less likely 2/2 infectious etiology: viral URI negative, CXR without consolidation, UA negative, no wheezing on examination. Less likely trauma as CTH negative for hemorrhage, no history of recent falls elucidated. Less likely paraneoplastic syndrome as no current malignancy present. Possibly 2/2 poorly controlled depression?   - f/u PT recs  - monitor electrolytes (Ca borderline)  - obtain collateral from o/p PMD Dr. Darrin Francisco  - f/u TSH, B12, folate, vit D.    Problem/Plan - 2:  ·  Problem: History of COPD.   ·  Plan: Without any signs of current exacerbation. On home incruse ellipta 1 puff qd. Patient notes remote history of lung cancer (cannot define time course, treatment, or pulmonologist).   - c/w home med (therapeutic interchange)    #Protein gap  - f/u SPEP, UPEP, Hep panel.    Problem/Plan - 3:  ·  Problem: Anxiety and depression.   ·  Plan: On home duloxetine 30mg PO qd + hydroxyzine 25mg PO (unclear frequency and indication)  - obtain collateral from PMD v daughter regarding indication for hydroxyzine  - c/w duloxetine.    Problem/Plan - 4:  ·  Problem: Hyperlipidemia.   ·  Plan: On home atorvastatin 40mg PO qHS  - f/u lipid profile  - c/w home med.    Problem/Plan - 5:  ·  Problem: Need for prophylactic measure.   ·  Plan: F: tolerating PO  E: replete K<4, Mg<2  N: Regular  VTE ppx: Lovenox 40 Q24H  GI: not needed  D: RMF.

## 2023-04-22 NOTE — H&P ADULT - NSHPLABSRESULTS_GEN_ALL_CORE
14.5   9.29  )-----------( 269      ( 21 Apr 2023 15:29 )             44.6       04-21    137  |  103  |  24<H>  ----------------------------<  132<H>  4.2   |  25  |  1.01    Ca    9.8      21 Apr 2023 15:29    TPro  8.2  /  Alb  3.2<L>  /  TBili  0.3  /  DBili  x   /  AST  25  /  ALT  25  /  AlkPhos  99  04-21              Urinalysis Basic - ( 21 Apr 2023 15:29 )    Color: Yellow / Appearance: Clear / SG: >=1.030 / pH: x  Gluc: x / Ketone: NEGATIVE  / Bili: NEGATIVE / Urobili: 0.2 E.U./dL   Blood: x / Protein: NEGATIVE mg/dL / Nitrite: NEGATIVE   Leuk Esterase: NEGATIVE / RBC: x / WBC x   Sq Epi: x / Non Sq Epi: x / Bacteria: x        PT/INR - ( 21 Apr 2023 15:29 )   PT: 12.6 sec;   INR: 1.09          PTT - ( 21 Apr 2023 15:29 )  PTT:30.7 sec    Lactate Trend            CAPILLARY BLOOD GLUCOSE      POCT Blood Glucose.: 131 mg/dL (21 Apr 2023 15:17)

## 2023-04-22 NOTE — H&P ADULT - PROBLEM SELECTOR PLAN 6
F: tolerating PO  E: replete K<4, Mg<2  N: Regular  VTE ppx: Lovenox 40 Q24H  GI: not needed  D: RMF

## 2023-04-22 NOTE — H&P ADULT - PROBLEM SELECTOR PLAN 4
On home atorvastatin 40mg PO qHS  - f/u lipid profile  - c/w home med On home duloxetine 30mg PO qd + hydroxyzine 25mg PO (unclear frequency and indication)  - obtain collateral from PMD v daughter regarding indication for hydroxyzine  - c/w duloxetine

## 2023-04-22 NOTE — PHYSICAL THERAPY INITIAL EVALUATION ADULT - MANUAL MUSCLE TESTING RESULTS, REHAB EVAL
Declined to participate in formal MMT, but pt able to perform seated LAQ and ankle pumps in AROM which is WFL

## 2023-04-22 NOTE — DISCHARGE NOTE PROVIDER - HOSPITAL COURSE
#Discharge: do not delete  74F w/ PMHx of osteoporosis, depression, COPD, left eye blindness, and progressively worsening bed bound status. EMS called because patient initially refused treatment from HHA. Per patient discussion there was some confusion regarding how HHA entered house, patient was subsequently fearful, thus refused treatment. Admitted for FTT (poor PO intake x 72h) and worsening bedbound status.     Problem List/Main Diagnoses (system-based):  #Chronic non-obstructing DVT  D-dimer 2444 i/s/o bedbound status for the past 4 weeks, w/ significant TTP of b/l LE and mild b/l swelling. Pt also reports 28 falls in the past year.  LE Doppler showing Probable postthrombotic chronic changes of the RIGHT popliteal vein. No evidence of acute deep venous thrombosis in either lower extremity.    - started on _________for anticoagulation  - outpatient follow-up w/ PCP    #Adult failure to thrive.  Patient normally ambulatory with walker. Per discussion, patient admits to decreased ambulatory status over the course of last 4 weeks. Patient receives 4h per day of HHA assistance, however, over the course of the last 48-72 hours patient has had minimal PO intake. When HHA arrived at patient's home today, was allowed in by patient's boyfriend (patient unaware) and patient subsequently refused HHA assistance due to fear. Deterioration likely multifactorial 2/2 dementia with decreased ability to ambulate at baseline. Less likely 2/2 infectious etiology: viral URI negative, CXR without consolidation, UA negative, no wheezing on examination. Less likely trauma as CTH negative for hemorrhage, no history of recent falls elucidated. Less likely paraneoplastic syndrome as no current malignancy present.   - PT recommending SHANNAN, however, patient adamantly refuses  - Would benefit from increased HHA hours    #History of COPD.  Without any signs of current exacerbation. On home incruse ellipta 1 puff qd. Patient notes remote history of lung cancer (cannot define time course, treatment, or pulmonologist).   - c/w home med (therapeutic interchange)    #Anxiety and depression.  On home duloxetine 30mg PO qd + hydroxyzine 25mg PO (unclear frequency and indication)  - c/w home meds on discharge    Hyperlipidemia.  On home atorvastatin 40mg PO qHs,   - increased to 80 mg PO qHs  - start on aspirin i/s/o bedbound status.      Patient was discharged to: Home w/ HHA  New medications: _______Eliquis?, aspirin  Changes to old medications: atorvastatin 80 mg PO qHs  Medications that were stopped: none  Items to follow up as outpatient: PCP  Physical exam at the time of discharge:    Constitutional: resting comfortably in bed; NAD  Head: NC/AT  Eyes: PERRL, EOMI, anicteric sclera  ENT: no nasal discharge; uvula midline, no oropharyngeal erythema or exudates; MM dry  Neck: supple; no JVD or thyromegaly  Respiratory: CTA B/L; no W/R/R, no retractions  Cardiac: +S1/S2; RRR; no M/R/G; PMI non-displaced  Gastrointestinal: abdomen soft, NT/ND; no rebound or guarding; +BSx4  Back: spine midline, no bony tenderness or step-offs; no CVAT B/L  Extremities: WWP, no clubbing or cyanosis; no peripheral edema  Musculoskeletal: NROM x4; no joint swelling, tenderness or erythema  Vascular: 2+ radial, femoral, DP/PT pulses B/L  Dermatologic: skin warm, dry and intact; no rashes, wounds, or scars  Lymphatic: no submandibular or cervical LAD  Neurologic: AAOx3; CNII-XII grossly intact; no focal deficits  Psychiatric: affect and characteristics of appearance, verbalizations, behaviors are appropriate #Discharge: do not delete  74F w/ PMHx of osteoporosis, depression, COPD, left eye blindness, and progressively worsening bed bound status. EMS called because patient initially refused treatment from HHA. Per patient discussion there was some confusion regarding how HHA entered house, patient was subsequently fearful, thus refused treatment. Admitted for FTT (poor PO intake x 72h) and worsening bedbound status.     Problem List/Main Diagnoses (system-based):  #Chronic non-obstructing DVT  D-dimer 2444 i/s/o bedbound status for the past 4 weeks, w/ significant TTP of b/l LE and mild b/l swelling. Pt also reports 28 falls in the past year. LE Doppler showing Probable postthrombotic chronic changes of the RIGHT popliteal vein. No evidence of acute deep venous thrombosis in either lower extremity. Pt admits to having old DVT diagnosed 30 years ago. Never been on AC.  - no AC indicated at this time  - outpatient follow-up w/ PCP    #Adult failure to thrive.  Patient normally ambulatory with walker. Per discussion, patient admits to decreased ambulatory status over the course of last 4 weeks. Patient receives 4h per day of HHA assistance, however, over the course of the last 48-72 hours patient has had minimal PO intake. When HHA arrived at patient's home today, was allowed in by patient's boyfriend (patient unaware) and patient subsequently refused HHA assistance due to fear. Deterioration likely multifactorial 2/2 dementia with decreased ability to ambulate at baseline. Less likely 2/2 infectious etiology: viral URI negative, CXR without consolidation, UA negative, no wheezing on examination. Less likely trauma as CTH negative for hemorrhage, no history of recent falls elucidated. Less likely paraneoplastic syndrome as no current malignancy present.   - PT recommending SHANNAN, however, patient adamantly refuses  - Would benefit from increased HHA hours    #History of COPD.  Without any signs of current exacerbation. On home incruse ellipta 1 puff qd. Patient notes remote history of lung cancer (cannot define time course, treatment, or pulmonologist).   - c/w home med (therapeutic interchange)    #Anxiety and depression.  On home duloxetine 30mg PO qd + hydroxyzine 25mg PO (unclear frequency and indication)  - c/w home meds on discharge    Hyperlipidemia.  On home atorvastatin 40mg PO qHs,   - increased to 80 mg PO qHs  - start on aspirin i/s/o bedbound status.      Patient was discharged to: Home w/ HHA. Patient requires increase HHA hours due to impaired mobility, bedbound, and increased risk of repeat urinary tract infections. It is unsafe for the patient to be getting only 4 hours per day given this.     New medications: aspirin 81mg qd  Changes to old medications: atorvastatin 80 mg PO qHs  Medications that were stopped: none  Items to follow up as outpatient: PCP    Physical exam at the time of discharge:  Constitutional: resting comfortably in bed; NAD  Head: NC/AT  Eyes: PERRL, EOMI, anicteric sclera  ENT: no nasal discharge; uvula midline, no oropharyngeal erythema or exudates; MM dry  Neck: supple; no JVD or thyromegaly  Respiratory: CTA B/L; no W/R/R, no retractions  Cardiac: +S1/S2; RRR; no M/R/G; PMI non-displaced  Gastrointestinal: abdomen soft, NT/ND; no rebound or guarding; +BSx4  Back: spine midline, no bony tenderness or step-offs; no CVAT B/L  Extremities: WWP, no clubbing or cyanosis; no peripheral edema  Musculoskeletal: NROM x4; no joint swelling, tenderness or erythema  Vascular: 2+ radial, femoral, DP/PT pulses B/L  Dermatologic: skin warm, dry and intact; no rashes, wounds, or scars  Lymphatic: no submandibular or cervical LAD  Neurologic: AAOx3; CNII-XII grossly intact; no focal deficits  Psychiatric: affect and characteristics of appearance, verbalizations, behaviors are appropriate #Discharge: do not delete  74F w/ PMHx of osteoporosis, depression, COPD, left eye blindness, and progressively worsening bed bound status. EMS called because patient initially refused treatment from HHA. Per patient discussion there was some confusion regarding how HHA entered house, patient was subsequently fearful, thus refused treatment. Admitted for FTT (poor PO intake x 72h) and worsening bedbound status.     Problem List/Main Diagnoses (system-based):  #Chronic non-obstructing DVT  D-dimer 2444 i/s/o bedbound status for the past 4 weeks, w/ significant TTP of b/l LE and mild b/l swelling. Pt also reports 28 falls in the past year. LE Doppler showing Probable postthrombotic chronic changes of the RIGHT popliteal vein. No evidence of acute deep venous thrombosis in either lower extremity. Pt admits to having old DVT diagnosed 30 years ago. Never been on AC.  - no AC indicated at this time  - outpatient follow-up w/ PCP    #Adult failure to thrive.  Patient normally ambulatory with walker. Per discussion, patient admits to decreased ambulatory status over the course of last 4 weeks. Patient receives 4h per day of HHA assistance, however, over the course of the last 48-72 hours patient has had minimal PO intake. When HHA arrived at patient's home today, was allowed in by patient's boyfriend (patient unaware) and patient subsequently refused HHA assistance due to fear. Deterioration likely multifactorial 2/2 dementia with decreased ability to ambulate at baseline. Less likely 2/2 infectious etiology: viral URI negative, CXR without consolidation, UA negative, no wheezing on examination. Less likely trauma as CTH negative for hemorrhage, no history of recent falls elucidated. Less likely paraneoplastic syndrome as no current malignancy present.   - PT recommending SHANNAN, however, patient adamantly refuses  - Would benefit from increased HHA hours    #History of COPD.  Without any signs of current exacerbation. On home incruse ellipta 1 puff qd. Patient notes remote history of lung cancer (cannot define time course, treatment, or pulmonologist).   - c/w home med (therapeutic interchange)    #Anxiety and depression.  On home duloxetine 30mg PO qd + hydroxyzine 25mg PO (unclear frequency and indication)  - c/w home meds on discharge    #Hyperlipidemia.  #Microvascular disease  On home atorvastatin 40mg PO qHs,   - c/w home meds  - start on aspirin i/s/o bedbound status.      Patient was discharged to: Home w/ HHA. Patient requires increase HHA hours due to impaired mobility, bedbound, and increased risk of repeat urinary tract infections. It is unsafe for the patient to be getting only 4 hours per day given this.     New medications: aspirin 81mg qd  Changes to old medications: atorvastatin 80 mg PO qHs  Medications that were stopped: none  Items to follow up as outpatient: PCP    Physical exam at the time of discharge:  Constitutional: resting comfortably in bed; NAD  Head: NC/AT  Eyes: PERRL, EOMI, anicteric sclera  ENT: no nasal discharge; uvula midline, no oropharyngeal erythema or exudates; MM dry  Neck: supple; no JVD or thyromegaly  Respiratory: CTA B/L; no W/R/R, no retractions  Cardiac: +S1/S2; RRR; no M/R/G; PMI non-displaced  Gastrointestinal: abdomen soft, NT/ND; no rebound or guarding; +BSx4  Back: spine midline, no bony tenderness or step-offs; no CVAT B/L  Extremities: WWP, no clubbing or cyanosis; no peripheral edema  Musculoskeletal: NROM x4; no joint swelling, tenderness or erythema  Vascular: 2+ radial, femoral, DP/PT pulses B/L  Dermatologic: skin warm, dry and intact; no rashes, wounds, or scars  Lymphatic: no submandibular or cervical LAD  Neurologic: AAOx3; CNII-XII grossly intact; no focal deficits  Psychiatric: affect and characteristics of appearance, verbalizations, behaviors are appropriate

## 2023-04-22 NOTE — H&P ADULT - HISTORY OF PRESENT ILLNESS
74F w/ PMHx of osteoporosis, depression, COPD, left eye blindness, and progressively worsening bed bound status. Per EMS, patient's HHA called 911 because patient was refusing to allow herself to be cared for. Patient is bedbound. Patient said there was someone in her apartment who she didn't know. Possibly new aide, as EMS said aide did not know patient's medical conditions. No fever, cp, sob, ap, n/v/d.    Patient required haldol to complete CTH in the ED. Patient's daughter [facility] [phone] reported patient had not gotten out of bed in 4 weeks but only has 4 hours of home care per day. Has not been allowing aides to clean her. Boyfriend Home is moving out soon. Patient has been becoming gradually more confused for the past 10-11 months.     Vitals on admission: T 97.9F, , BP 95/68, RR: 18, SpO2: 95% on 2LNC    Labs significant for: WBC 9.29, Hgb 14.5, MCV 94.3, plt 269, BUN 24, Cr 1.01 (baseline 0.95 from 2020), glucose 132, Ca corrected 10.4, protein 8.2, albumin 3.2, UA negative, COVID/Flu A/Flu B/RSV negative, electrolytes grossly normal    EKG: Sinus rhythm with PACs    CXR: increased pulmonary vascular markings     CTH: No acute intracranial injury. Moderate to severe microvascular disease. Chronic lacunar infarcts.    Medications: 1LNS, haloperidol 2mg IM

## 2023-04-22 NOTE — PHYSICAL THERAPY INITIAL EVALUATION ADULT - ADDITIONAL COMMENTS
Pt reports living alone in apartment without NELY. Reports being bedbound, has not walked in "20 years". Reports having HHA for 4hrs per day. Reports having rolling walker which she uses to go to bathroom (refused to further specify if pt stands).

## 2023-04-22 NOTE — PATIENT PROFILE ADULT - TOBACCO USE
Update History & Physical    The Patient's History and Physical of was reviewed with the patient and I examined the patient. There was no change. The surgical site was confirmed by the patient and me. Plan:  The risk, benefits, expected outcome, and alternative to the recommended procedure have been discussed with the patient. Patient understands and wants to proceed with the procedure.     Electronically signed by Gallo Harvey MD on 4/20/2021 at 1:54 PM Unknown if ever smoked

## 2023-04-22 NOTE — H&P ADULT - PROBLEM SELECTOR PLAN 3
On home duloxetine 30mg PO qd + hydroxyzine 25mg PO (unclear frequency and indication)  - obtain collateral from PMD v daughter regarding indication for hydroxyzine  - c/w duloxetine Without any signs of current exacerbation. On home incruse ellipta 1 puff qd. Patient notes remote history of lung cancer (cannot define time course, treatment, or pulmonologist).   - c/w home med (therapeutic interchange)    #Protein gap  - f/u SPEP, UPEP, Hep panel

## 2023-04-23 DIAGNOSIS — I67.89 OTHER CEREBROVASCULAR DISEASE: ICD-10-CM

## 2023-04-23 LAB
24R-OH-CALCIDIOL SERPL-MCNC: 29.7 NG/ML — LOW (ref 30–80)
ANION GAP SERPL CALC-SCNC: 8 MMOL/L — SIGNIFICANT CHANGE UP (ref 5–17)
BUN SERPL-MCNC: 20 MG/DL — SIGNIFICANT CHANGE UP (ref 7–23)
CALCIUM SERPL-MCNC: 8.9 MG/DL — SIGNIFICANT CHANGE UP (ref 8.4–10.5)
CHLORIDE SERPL-SCNC: 101 MMOL/L — SIGNIFICANT CHANGE UP (ref 96–108)
CO2 SERPL-SCNC: 27 MMOL/L — SIGNIFICANT CHANGE UP (ref 22–31)
CREAT SERPL-MCNC: 0.77 MG/DL — SIGNIFICANT CHANGE UP (ref 0.5–1.3)
EGFR: 80 ML/MIN/1.73M2 — SIGNIFICANT CHANGE UP
FOLATE SERPL-MCNC: 8.4 NG/ML — SIGNIFICANT CHANGE UP
GLUCOSE SERPL-MCNC: 89 MG/DL — SIGNIFICANT CHANGE UP (ref 70–99)
HCT VFR BLD CALC: 38.7 % — SIGNIFICANT CHANGE UP (ref 34.5–45)
HCV AB S/CO SERPL IA: 0.21 S/CO — SIGNIFICANT CHANGE UP (ref 0–0.99)
HCV AB SERPL-IMP: SIGNIFICANT CHANGE UP
HGB BLD-MCNC: 12.6 G/DL — SIGNIFICANT CHANGE UP (ref 11.5–15.5)
MAGNESIUM SERPL-MCNC: 1.8 MG/DL — SIGNIFICANT CHANGE UP (ref 1.6–2.6)
MCHC RBC-ENTMCNC: 30.8 PG — SIGNIFICANT CHANGE UP (ref 27–34)
MCHC RBC-ENTMCNC: 32.6 GM/DL — SIGNIFICANT CHANGE UP (ref 32–36)
MCV RBC AUTO: 94.6 FL — SIGNIFICANT CHANGE UP (ref 80–100)
NRBC # BLD: 0 /100 WBCS — SIGNIFICANT CHANGE UP (ref 0–0)
PLATELET # BLD AUTO: 220 K/UL — SIGNIFICANT CHANGE UP (ref 150–400)
POTASSIUM SERPL-MCNC: 4.3 MMOL/L — SIGNIFICANT CHANGE UP (ref 3.5–5.3)
POTASSIUM SERPL-SCNC: 4.3 MMOL/L — SIGNIFICANT CHANGE UP (ref 3.5–5.3)
PROT SERPL-MCNC: 5.9 G/DL — LOW (ref 6–8.3)
PROT SERPL-MCNC: 5.9 G/DL — LOW (ref 6–8.3)
RBC # BLD: 4.09 M/UL — SIGNIFICANT CHANGE UP (ref 3.8–5.2)
RBC # FLD: 13.6 % — SIGNIFICANT CHANGE UP (ref 10.3–14.5)
SODIUM SERPL-SCNC: 136 MMOL/L — SIGNIFICANT CHANGE UP (ref 135–145)
VIT B12 SERPL-MCNC: 469 PG/ML — SIGNIFICANT CHANGE UP (ref 232–1245)
VIT D25+D1,25 OH+D1,25 PNL SERPL-MCNC: 19.4 PG/ML — LOW (ref 19.9–79.3)
WBC # BLD: 7.15 K/UL — SIGNIFICANT CHANGE UP (ref 3.8–10.5)
WBC # FLD AUTO: 7.15 K/UL — SIGNIFICANT CHANGE UP (ref 3.8–10.5)

## 2023-04-23 PROCEDURE — 99232 SBSQ HOSP IP/OBS MODERATE 35: CPT | Mod: GC

## 2023-04-23 PROCEDURE — 73590 X-RAY EXAM OF LOWER LEG: CPT | Mod: 26,50

## 2023-04-23 RX ADMIN — ATORVASTATIN CALCIUM 80 MILLIGRAM(S): 80 TABLET, FILM COATED ORAL at 22:51

## 2023-04-23 RX ADMIN — TIOTROPIUM BROMIDE 2 PUFF(S): 18 CAPSULE ORAL; RESPIRATORY (INHALATION) at 13:42

## 2023-04-23 RX ADMIN — ENOXAPARIN SODIUM 40 MILLIGRAM(S): 100 INJECTION SUBCUTANEOUS at 05:45

## 2023-04-23 RX ADMIN — Medication 81 MILLIGRAM(S): at 12:45

## 2023-04-23 NOTE — PROGRESS NOTE ADULT - SUBJECTIVE AND OBJECTIVE BOX
CC: Patient is a 77y old  Female who presents with a chief complaint of FTT (22 Apr 2023 22:55)    INTERVAL EVENTS: CASIMIRO    SUBJECTIVE / INTERVAL HPI: Patient seen and examined at bedside.     ROS: negative unless otherwise stated above.    VITAL SIGNS:  Vital Signs Last 24 Hrs  T(C): 36.4 (23 Apr 2023 05:38), Max: 36.8 (22 Apr 2023 20:40)  T(F): 97.6 (23 Apr 2023 05:38), Max: 98.2 (22 Apr 2023 20:40)  HR: 79 (23 Apr 2023 05:38) (79 - 91)  BP: 146/72 (23 Apr 2023 05:38) (95/60 - 146/72)  BP(mean): --  RR: 18 (23 Apr 2023 05:38) (17 - 18)  SpO2: 94% (23 Apr 2023 05:38) (89% - 94%)    Parameters below as of 23 Apr 2023 05:38  Patient On (Oxygen Delivery Method): nasal cannula      PHYSICAL EXAM:  Constitutional: resting comfortably; NAD  HEENT: NC/AT, PERRL, EOMI, anicteric sclera, MMM  Neck: supple; no JVD or thyromegaly  Respiratory: CTA B/L; no W/R/R, no retractions  Cardiac: +S1/S2; RRR; no M/R/G  Gastrointestinal: soft, NT/ND; no rebound or guarding; +BSx4  Extremities: WWP, no clubbing or cyanosis; no peripheral edema  Musculoskeletal: NROM x4; no joint swelling, tenderness or erythema  Vascular: 2+ radial, DP/PT pulses B/L  Dermatologic: skin warm, dry and intact; no rashes, wounds, or scars  Neurologic: AAOx3, no focal deficits  Psychiatric: calm, cooperative, behaviors are appropriate, denies SI/HI    MEDICATIONS:  MEDICATIONS  (STANDING):  aspirin  chewable 81 milliGRAM(s) Oral daily  atorvastatin 80 milliGRAM(s) Oral at bedtime  enoxaparin Injectable 40 milliGRAM(s) SubCutaneous every 24 hours  tiotropium 2.5 MICROgram(s) Inhaler 2 Puff(s) Inhalation daily    MEDICATIONS  (PRN):  acetaminophen     Tablet .. 650 milliGRAM(s) Oral every 6 hours PRN Temp greater or equal to 38C (100.4F), Mild Pain (1 - 3)  melatonin 3 milliGRAM(s) Oral at bedtime PRN Insomnia      ALLERGIES:  Allergies    Allergy Status Unknown    Intolerances        LABS:                        12.6   7.15  )-----------( 220      ( 23 Apr 2023 05:30 )             38.7     04-23    136  |  101  |  20  ----------------------------<  89  4.3   |  27  |  0.77    Ca    8.9      23 Apr 2023 05:30  Phos  4.0     04-22  Mg     1.8     04-23    TPro  6.3  /  Alb  3.3  /  TBili  0.3  /  DBili  x   /  AST  16  /  ALT  14  /  AlkPhos  86  04-22    PT/INR - ( 22 Apr 2023 10:29 )   PT: 13.4 sec;   INR: 1.12          PTT - ( 22 Apr 2023 10:29 )  PTT:34.0 sec  Urinalysis Basic - ( 21 Apr 2023 15:29 )    Color: Yellow / Appearance: Clear / SG: >=1.030 / pH: x  Gluc: x / Ketone: NEGATIVE  / Bili: NEGATIVE / Urobili: 0.2 E.U./dL   Blood: x / Protein: NEGATIVE mg/dL / Nitrite: NEGATIVE   Leuk Esterase: NEGATIVE / RBC: x / WBC x   Sq Epi: x / Non Sq Epi: x / Bacteria: x      POCT Blood Glucose.: 131 mg/dL (21 Apr 2023 15:17)      RADIOLOGY & ADDITIONAL TESTS: Reviewed.  
Patient is a 77y old  Female who presents with a chief complaint of FTT (22 Apr 2023 22:55)    INTERVAL EVENTS: CASIMIRO, ate dinner last night    SUBJECTIVE:  Patient was seen and examined at bedside. She has no acute complaints    Review of systems: Patient denies: fever, chills, dizziness, HA, Changes in vision, CP, dyspnea, nausea or vomiting, dysuria, changes in bowel movements, LE edema. Rest of 12 point Review of systems negative unless otherwise documented elsewhere in note.     Diet, Regular (04-22-23 @ 05:06) [Active]      MEDICATIONS:  MEDICATIONS  (STANDING):  aspirin  chewable 81 milliGRAM(s) Oral daily  atorvastatin 80 milliGRAM(s) Oral at bedtime  enoxaparin Injectable 40 milliGRAM(s) SubCutaneous every 24 hours  tiotropium 2.5 MICROgram(s) Inhaler 2 Puff(s) Inhalation daily    MEDICATIONS  (PRN):  acetaminophen     Tablet .. 650 milliGRAM(s) Oral every 6 hours PRN Temp greater or equal to 38C (100.4F), Mild Pain (1 - 3)  melatonin 3 milliGRAM(s) Oral at bedtime PRN Insomnia      Allergies    Allergy Status Unknown    Intolerances        OBJECTIVE:  Vital Signs Last 24 Hrs  T(C): 36.4 (23 Apr 2023 05:38), Max: 36.8 (22 Apr 2023 20:40)  T(F): 97.6 (23 Apr 2023 05:38), Max: 98.2 (22 Apr 2023 20:40)  HR: 79 (23 Apr 2023 05:38) (79 - 91)  BP: 146/72 (23 Apr 2023 05:38) (95/60 - 146/72)  BP(mean): --  RR: 18 (23 Apr 2023 05:38) (17 - 18)  SpO2: 94% (23 Apr 2023 05:38) (89% - 94%)    Parameters below as of 23 Apr 2023 05:38  Patient On (Oxygen Delivery Method): nasal cannula      I&O's Summary      PHYSICAL EXAM:  Gen: Reclining in bed at time of exam, appears stated age  HEENT: NCAT, MMM, clear OP  Neck: supple, trachea at midline  CV: RRR, +S1/S2  Pulm: adequate respiratory effort, no increase in work of breathing  Abd: soft, NTND  Skin: warm and dry, no new rashes vs prior report  Ext: WWP, non pitting edema  Neuro: AOx2 - 3, no gross focal neurological deficits  Psych: affect and behavior appropriate, pleasant at time of interview    LABS:                        12.6   7.15  )-----------( 220      ( 23 Apr 2023 05:30 )             38.7     04-23    136  |  101  |  20  ----------------------------<  89  4.3   |  27  |  0.77    Ca    8.9      23 Apr 2023 05:30  Phos  4.0     04-22  Mg     1.8     04-23    TPro  6.3  /  Alb  3.3  /  TBili  0.3  /  DBili  x   /  AST  16  /  ALT  14  /  AlkPhos  86  04-22    LIVER FUNCTIONS - ( 22 Apr 2023 10:29 )  Alb: 3.3 g/dL / Pro: 6.3 g/dL / ALK PHOS: 86 U/L / ALT: 14 U/L / AST: 16 U/L / GGT: x           PT/INR - ( 22 Apr 2023 10:29 )   PT: 13.4 sec;   INR: 1.12          PTT - ( 22 Apr 2023 10:29 )  PTT:34.0 sec  CAPILLARY BLOOD GLUCOSE        Urinalysis Basic - ( 21 Apr 2023 15:29 )    Color: Yellow / Appearance: Clear / SG: >=1.030 / pH: x  Gluc: x / Ketone: NEGATIVE  / Bili: NEGATIVE / Urobili: 0.2 E.U./dL   Blood: x / Protein: NEGATIVE mg/dL / Nitrite: NEGATIVE   Leuk Esterase: NEGATIVE / RBC: x / WBC x   Sq Epi: x / Non Sq Epi: x / Bacteria: x        MICRODATA:    Culture - Urine (collected 21 Apr 2023 15:29)  Source: Clean Catch Clean Catch (Midstream)  Final Report (22 Apr 2023 23:44):    <10,000 CFU/mL Normal Urogenital Xochilt    Culture - Blood (collected 21 Apr 2023 15:29)  Source: .Blood Blood-Peripheral  Preliminary Report (23 Apr 2023 01:02):    No growth to date.    Culture - Blood (collected 21 Apr 2023 15:29)  Source: .Blood Blood-Peripheral  Preliminary Report (23 Apr 2023 01:02):    No growth to date.        RADIOLOGY/OTHER STUDIES:

## 2023-04-23 NOTE — PROGRESS NOTE ADULT - PROBLEM SELECTOR PLAN 4
On home duloxetine 30mg PO qd + hydroxyzine 25mg PO (unclear frequency and indication)  - obtain collateral from PMD v daughter regarding indication for hydroxyzine  - c/w duloxetine

## 2023-04-23 NOTE — PROGRESS NOTE ADULT - PROBLEM SELECTOR PLAN 2
D-dimer 2444 i/s/o bedbound status for the past 4 weeks, w/ significant TTP of b/l LE and mild b/l swelling. Pt also reports 28 falls in the past year.     -f/u dopplers of b/l LE  -f/u b/l LE Xray

## 2023-04-23 NOTE — PROGRESS NOTE ADULT - PROBLEM SELECTOR PLAN 2
Patient normally ambulatory with walker. Per discussion, patient admits to decreased ambulatory status over the course of last 4 weeks. Patient receives 4h per day of HHA assistance, however, over the course of the last 48-72 hours patient has had minimal PO intake. When HHA arrived at patient's home today, was allowed in by patient's boyfriend (patient unaware) and patient subsequently refused HHA assistance due to fear. Deterioration likely multifactorial 2/2 dementia with decreased ability to ambulate at baseline. Less likely 2/2 infectious etiology: viral URI negative, CXR without consolidation, UA negative, no wheezing on examination. Less likely trauma as CTH negative for hemorrhage, no history of recent falls elucidated. Less likely paraneoplastic syndrome as no current malignancy present. Possibly 2/2 poorly controlled depression?   - f/u PT recs: patient refusing SHANNAN, home with home HHA  - monitor electrolytes (Ca borderline)  - obtain collateral from o/p PMD Dr. Darrin Francisco  - f/u TSH, B12, folate, vit D

## 2023-04-23 NOTE — PROGRESS NOTE ADULT - PROBLEM SELECTOR PLAN 5
On home atorvastatin 40mg PO qHS  - f/u lipid profile  - increase atorvastatin to 80 mg PO qHs  - start on aspirin i/s/o bedbound status

## 2023-04-23 NOTE — PROGRESS NOTE ADULT - PROBLEM SELECTOR PLAN 1
D-dimer 2444 i/s/o bedbound status for the past 4 weeks, w/ significant TTP of b/l LE and mild b/l swelling. Pt also reports 28 falls in the past year.   - Dopplers showing chronic RLE popliteal DVT, known according to patient  - No need for AC

## 2023-04-23 NOTE — PROGRESS NOTE ADULT - PROBLEM SELECTOR PLAN 3
Without any signs of current exacerbation. On home incruse ellipta 1 puff qd. Patient notes remote history of lung cancer (cannot define time course, treatment, or pulmonologist).   - c/w home med (therapeutic interchange)    #Protein gap  - f/u SPEP, UPEP, Hep panel

## 2023-04-24 ENCOUNTER — TRANSCRIPTION ENCOUNTER (OUTPATIENT)
Age: 78
End: 2023-04-24

## 2023-04-24 VITALS
TEMPERATURE: 98 F | DIASTOLIC BLOOD PRESSURE: 78 MMHG | OXYGEN SATURATION: 95 % | SYSTOLIC BLOOD PRESSURE: 149 MMHG | RESPIRATION RATE: 18 BRPM | HEART RATE: 82 BPM

## 2023-04-24 PROBLEM — Z00.00 ENCOUNTER FOR PREVENTIVE HEALTH EXAMINATION: Status: ACTIVE | Noted: 2023-04-24

## 2023-04-24 LAB
-  BLOOD PCR PANEL: SIGNIFICANT CHANGE UP
GRAM STN FLD: SIGNIFICANT CHANGE UP
METHOD TYPE: SIGNIFICANT CHANGE UP

## 2023-04-24 PROCEDURE — 36415 COLL VENOUS BLD VENIPUNCTURE: CPT

## 2023-04-24 PROCEDURE — 73590 X-RAY EXAM OF LOWER LEG: CPT

## 2023-04-24 PROCEDURE — 85610 PROTHROMBIN TIME: CPT

## 2023-04-24 PROCEDURE — 36000 PLACE NEEDLE IN VEIN: CPT

## 2023-04-24 PROCEDURE — G0378: CPT

## 2023-04-24 PROCEDURE — 82962 GLUCOSE BLOOD TEST: CPT

## 2023-04-24 PROCEDURE — 87637 SARSCOV2&INF A&B&RSV AMP PRB: CPT

## 2023-04-24 PROCEDURE — 70450 CT HEAD/BRAIN W/O DYE: CPT

## 2023-04-24 PROCEDURE — 85027 COMPLETE CBC AUTOMATED: CPT

## 2023-04-24 PROCEDURE — 84165 PROTEIN E-PHORESIS SERUM: CPT

## 2023-04-24 PROCEDURE — 80048 BASIC METABOLIC PNL TOTAL CA: CPT

## 2023-04-24 PROCEDURE — 82550 ASSAY OF CK (CPK): CPT

## 2023-04-24 PROCEDURE — 99239 HOSP IP/OBS DSCHRG MGMT >30: CPT | Mod: GC

## 2023-04-24 PROCEDURE — 81003 URINALYSIS AUTO W/O SCOPE: CPT

## 2023-04-24 PROCEDURE — 97161 PT EVAL LOW COMPLEX 20 MIN: CPT

## 2023-04-24 PROCEDURE — 87150 DNA/RNA AMPLIFIED PROBE: CPT

## 2023-04-24 PROCEDURE — 85025 COMPLETE CBC W/AUTO DIFF WBC: CPT

## 2023-04-24 PROCEDURE — 94640 AIRWAY INHALATION TREATMENT: CPT

## 2023-04-24 PROCEDURE — 83735 ASSAY OF MAGNESIUM: CPT

## 2023-04-24 PROCEDURE — 87040 BLOOD CULTURE FOR BACTERIA: CPT

## 2023-04-24 PROCEDURE — 93970 EXTREMITY STUDY: CPT

## 2023-04-24 PROCEDURE — 86334 IMMUNOFIX E-PHORESIS SERUM: CPT

## 2023-04-24 PROCEDURE — 82607 VITAMIN B-12: CPT

## 2023-04-24 PROCEDURE — 82652 VIT D 1 25-DIHYDROXY: CPT

## 2023-04-24 PROCEDURE — 87086 URINE CULTURE/COLONY COUNT: CPT

## 2023-04-24 PROCEDURE — 71045 X-RAY EXAM CHEST 1 VIEW: CPT

## 2023-04-24 PROCEDURE — 84443 ASSAY THYROID STIM HORMONE: CPT

## 2023-04-24 PROCEDURE — 85379 FIBRIN DEGRADATION QUANT: CPT

## 2023-04-24 PROCEDURE — 87077 CULTURE AEROBIC IDENTIFY: CPT

## 2023-04-24 PROCEDURE — 80061 LIPID PANEL: CPT

## 2023-04-24 PROCEDURE — 96372 THER/PROPH/DIAG INJ SC/IM: CPT

## 2023-04-24 PROCEDURE — 82746 ASSAY OF FOLIC ACID SERUM: CPT

## 2023-04-24 PROCEDURE — 84155 ASSAY OF PROTEIN SERUM: CPT

## 2023-04-24 PROCEDURE — 80053 COMPREHEN METABOLIC PANEL: CPT

## 2023-04-24 PROCEDURE — 85730 THROMBOPLASTIN TIME PARTIAL: CPT

## 2023-04-24 PROCEDURE — 80074 ACUTE HEPATITIS PANEL: CPT

## 2023-04-24 PROCEDURE — 82306 VITAMIN D 25 HYDROXY: CPT

## 2023-04-24 PROCEDURE — 84100 ASSAY OF PHOSPHORUS: CPT

## 2023-04-24 PROCEDURE — 83880 ASSAY OF NATRIURETIC PEPTIDE: CPT

## 2023-04-24 PROCEDURE — 99285 EMERGENCY DEPT VISIT HI MDM: CPT | Mod: 25

## 2023-04-24 RX ORDER — ASPIRIN/CALCIUM CARB/MAGNESIUM 324 MG
1 TABLET ORAL
Qty: 30 | Refills: 0
Start: 2023-04-24 | End: 2023-05-23

## 2023-04-24 RX ADMIN — ENOXAPARIN SODIUM 40 MILLIGRAM(S): 100 INJECTION SUBCUTANEOUS at 06:15

## 2023-04-24 RX ADMIN — TIOTROPIUM BROMIDE 2 PUFF(S): 18 CAPSULE ORAL; RESPIRATORY (INHALATION) at 11:45

## 2023-04-24 RX ADMIN — Medication 81 MILLIGRAM(S): at 11:45

## 2023-04-24 NOTE — DISCHARGE NOTE NURSING/CASE MANAGEMENT/SOCIAL WORK - PATIENT PORTAL LINK FT
You can access the FollowMyHealth Patient Portal offered by [facility] by registering at the following website: [email or web address]. By joining [employer]’s FollowMyHealth portal, you will also be able to view your health information using other applications (apps) compatible with our system.

## 2023-04-24 NOTE — DISCHARGE NOTE NURSING/CASE MANAGEMENT/SOCIAL WORK - NSDCFUADDAPPT_GEN_ALL_CORE_FT
Please call [phone] to confirm your follow-up appointment to establish care with a primary care physician within 1 week after you leave the hospital.

## 2023-04-24 NOTE — DISCHARGE NOTE NURSING/CASE MANAGEMENT/SOCIAL WORK - HISTORY OF COVID-19 VACCINATION
General Examination: General appearance: -> alert, pleasant, well-nourished and in no acute distress   Heart: -> regular rate and rhythm without murmurs, gallops, clicks or rubs , S1 and S2 are normal and no S3 and S4 gallop   Abdomen: -> soft with good bowel sounds, nontender, and no masses or hepatosplenomegaly   Rectal: -> not examined      
Vaccine status unknown

## 2023-04-25 LAB — GRAM STN FLD: SIGNIFICANT CHANGE UP

## 2023-04-26 LAB
% ALBUMIN: 49 % — SIGNIFICANT CHANGE UP
% ALPHA 1: 5.5 % — SIGNIFICANT CHANGE UP
% ALPHA 2: 15 % — SIGNIFICANT CHANGE UP
% BETA: 15 % — SIGNIFICANT CHANGE UP
% GAMMA: 15.5 % — SIGNIFICANT CHANGE UP
ALBUMIN SERPL ELPH-MCNC: 2.9 G/DL — LOW (ref 3.6–5.5)
ALBUMIN/GLOB SERPL ELPH: 1 RATIO — SIGNIFICANT CHANGE UP
ALPHA1 GLOB SERPL ELPH-MCNC: 0.3 G/DL — SIGNIFICANT CHANGE UP (ref 0.1–0.4)
ALPHA2 GLOB SERPL ELPH-MCNC: 0.9 G/DL — SIGNIFICANT CHANGE UP (ref 0.5–1)
B-GLOBULIN SERPL ELPH-MCNC: 0.9 G/DL — SIGNIFICANT CHANGE UP (ref 0.5–1)
GAMMA GLOBULIN: 0.9 G/DL — SIGNIFICANT CHANGE UP (ref 0.6–1.6)
INTERPRETATION SERPL IFE-IMP: SIGNIFICANT CHANGE UP
PROT PATTERN SERPL ELPH-IMP: SIGNIFICANT CHANGE UP

## 2023-04-27 DIAGNOSIS — F03.94 UNSPECIFIED DEMENTIA, UNSPECIFIED SEVERITY, WITH ANXIETY: ICD-10-CM

## 2023-04-27 DIAGNOSIS — E78.5 HYPERLIPIDEMIA, UNSPECIFIED: ICD-10-CM

## 2023-04-27 DIAGNOSIS — Z74.01 BED CONFINEMENT STATUS: ICD-10-CM

## 2023-04-27 DIAGNOSIS — F03.93 UNSPECIFIED DEMENTIA, UNSPECIFIED SEVERITY, WITH MOOD DISTURBANCE: ICD-10-CM

## 2023-04-27 DIAGNOSIS — J44.9 CHRONIC OBSTRUCTIVE PULMONARY DISEASE, UNSPECIFIED: ICD-10-CM

## 2023-04-27 DIAGNOSIS — I82.531 CHRONIC EMBOLISM AND THROMBOSIS OF RIGHT POPLITEAL VEIN: ICD-10-CM

## 2023-04-27 DIAGNOSIS — R62.7 ADULT FAILURE TO THRIVE: ICD-10-CM

## 2023-05-01 ENCOUNTER — APPOINTMENT (OUTPATIENT)
Dept: INTERNAL MEDICINE | Facility: CLINIC | Age: 78
End: 2023-05-01

## 2023-06-06 LAB
CULTURE RESULTS: SIGNIFICANT CHANGE UP
CULTURE RESULTS: SIGNIFICANT CHANGE UP
ORGANISM # SPEC MICROSCOPIC CNT: SIGNIFICANT CHANGE UP
ORGANISM # SPEC MICROSCOPIC CNT: SIGNIFICANT CHANGE UP
SPECIMEN SOURCE: SIGNIFICANT CHANGE UP
SPECIMEN SOURCE: SIGNIFICANT CHANGE UP

## 2024-04-17 NOTE — ED ADULT NURSE NOTE - NSHOSCREENINGQ1_ED_ALL_ED
Pharmacy Note    Barbara Jack was ordered Co-enzyme Q-10.  As per the [facility] Formulary Committee Policy, herbals and certain dietary supplements will be discontinued.  The herbal or dietary supplement may be continued after discharge from the hospital.     No

## 2025-02-14 NOTE — ED PROVIDER NOTE - CROS ED ROS STATEMENT
Pt c/o having a cold. Pt c/o having nasal congestion and on/off wheezing for 3 days. Denies wheezing now. Pt requesting an inhaler. Advised to be seen today or tomorrow. VU. Appt scheduled per protocol. VU.Encounter routed to provider.     Reason for Disposition   Patient wants to be seen    Additional Information   Negative: SEVERE difficulty breathing (e.g., struggling for each breath, speaks in single words)   Negative: Very weak (can't stand)   Negative: Sounds like a life-threatening emergency to the triager   Negative: Patient sounds very sick or weak to the triager   Negative: Fever > 103 F (39.4 C)   Negative: Fever > 101 F (38.3 C) and over 60 years of age   Negative: Fever > 100 F (37.8 C) and has diabetes mellitus or a weak immune system (e.g., HIV positive, cancer chemotherapy, organ transplant, splenectomy, chronic steroids)   Negative: Fever > 100 F (37.8 C) and bedridden (e.g., CVA, chronic illness, recovering from surgery)   Negative: Fever present > 3 days (72 hours)   Negative: Fever returns after gone for over 24 hours and symptoms worse or not improved   Negative: Sinus pain (not just congestion) and fever   Negative: Earache   Negative: Sinus congestion (pressure, fullness) present > 10 days   Negative: Nasal discharge present > 10 days   Negative: Using nasal washes and pain medicine > 24 hours and sinus pain (lower forehead, cheekbone, or eye) persists    Protocols used: Common Cold-A-OH     all other ROS negative except as per HPI

## 2025-04-15 NOTE — PHYSICAL THERAPY INITIAL EVALUATION ADULT - IMPAIRMENTS CONTRIBUTING IMPAIRED BED MOBILITY, REHAB EVAL
Post-Op Assessment Note    CV Status:  Stable    Pain management: adequate       Mental Status:  Alert and awake   Hydration Status:  Euvolemic   PONV Controlled:  Controlled   Airway Patency:  Patent     Post Op Vitals Reviewed: Yes    No anethesia notable event occurred.    Staff: CRNA           Last Filed PACU Vitals:  Vitals Value Taken Time   Temp 97.6    Pulse 65 04/15/25 1730   /67 04/15/25 1730   Resp     SpO2 94 % 04/15/25 1730   Vitals shown include unfiled device data.            impaired balance/narrow base of support/impaired postural control/decreased strength